# Patient Record
Sex: FEMALE | Race: WHITE | NOT HISPANIC OR LATINO | Employment: FULL TIME | URBAN - METROPOLITAN AREA
[De-identification: names, ages, dates, MRNs, and addresses within clinical notes are randomized per-mention and may not be internally consistent; named-entity substitution may affect disease eponyms.]

---

## 2020-05-05 ENCOUNTER — APPOINTMENT (OUTPATIENT)
Dept: GENERAL RADIOLOGY | Facility: HOSPITAL | Age: 40
End: 2020-05-05

## 2020-05-05 ENCOUNTER — HOSPITAL ENCOUNTER (EMERGENCY)
Facility: HOSPITAL | Age: 40
Discharge: HOME | End: 2020-05-05
Attending: EMERGENCY MEDICINE | Admitting: EMERGENCY MEDICINE

## 2020-05-05 VITALS
WEIGHT: 165 LBS | DIASTOLIC BLOOD PRESSURE: 67 MMHG | OXYGEN SATURATION: 98 % | TEMPERATURE: 97.7 F | RESPIRATION RATE: 20 BRPM | BODY MASS INDEX: 28.17 KG/M2 | SYSTOLIC BLOOD PRESSURE: 104 MMHG | HEIGHT: 64 IN | HEART RATE: 102 BPM

## 2020-05-05 DIAGNOSIS — F41.1 GENERALIZED ANXIETY DISORDER: ICD-10-CM

## 2020-05-05 DIAGNOSIS — J45.901 MILD ASTHMA WITH EXACERBATION, UNSPECIFIED WHETHER PERSISTENT: Primary | ICD-10-CM

## 2020-05-05 LAB
ALBUMIN SERPL-MCNC: 4.7 G/DL (ref 3.5–5.2)
ALBUMIN/GLOB SERPL: 1.4 G/DL
ALP SERPL-CCNC: 68 U/L (ref 39–117)
ALT SERPL W P-5'-P-CCNC: 25 U/L (ref 1–33)
ANION GAP SERPL CALCULATED.3IONS-SCNC: 17 MMOL/L (ref 5–15)
AST SERPL-CCNC: 32 U/L (ref 1–32)
BASOPHILS # BLD AUTO: 0.03 10*3/MM3 (ref 0–0.2)
BASOPHILS NFR BLD AUTO: 0.3 % (ref 0–1.5)
BILIRUB SERPL-MCNC: 0.4 MG/DL (ref 0.2–1.2)
BUN BLD-MCNC: 11 MG/DL (ref 6–20)
BUN/CREAT SERPL: 15.7 (ref 7–25)
CALCIUM SPEC-SCNC: 9.6 MG/DL (ref 8.6–10.5)
CHLORIDE SERPL-SCNC: 98 MMOL/L (ref 98–107)
CO2 SERPL-SCNC: 22 MMOL/L (ref 22–29)
CREAT BLD-MCNC: 0.7 MG/DL (ref 0.57–1)
DEPRECATED RDW RBC AUTO: 44.6 FL (ref 37–54)
EOSINOPHIL # BLD AUTO: 0.03 10*3/MM3 (ref 0–0.4)
EOSINOPHIL NFR BLD AUTO: 0.3 % (ref 0.3–6.2)
ERYTHROCYTE [DISTWIDTH] IN BLOOD BY AUTOMATED COUNT: 12.5 % (ref 12.3–15.4)
GFR SERPL CREATININE-BSD FRML MDRD: 93 ML/MIN/1.73
GLOBULIN UR ELPH-MCNC: 3.3 GM/DL
GLUCOSE BLD-MCNC: 177 MG/DL (ref 65–99)
HCT VFR BLD AUTO: 47.1 % (ref 34–46.6)
HGB BLD-MCNC: 15.5 G/DL (ref 12–15.9)
HOLD SPECIMEN: NORMAL
HOLD SPECIMEN: NORMAL
IMM GRANULOCYTES # BLD AUTO: 0.04 10*3/MM3 (ref 0–0.05)
IMM GRANULOCYTES NFR BLD AUTO: 0.4 % (ref 0–0.5)
LYMPHOCYTES # BLD AUTO: 3.46 10*3/MM3 (ref 0.7–3.1)
LYMPHOCYTES NFR BLD AUTO: 33.9 % (ref 19.6–45.3)
MCH RBC QN AUTO: 31.6 PG (ref 26.6–33)
MCHC RBC AUTO-ENTMCNC: 32.9 G/DL (ref 31.5–35.7)
MCV RBC AUTO: 96.1 FL (ref 79–97)
MONOCYTES # BLD AUTO: 0.61 10*3/MM3 (ref 0.1–0.9)
MONOCYTES NFR BLD AUTO: 6 % (ref 5–12)
NEUTROPHILS # BLD AUTO: 6.04 10*3/MM3 (ref 1.7–7)
NEUTROPHILS NFR BLD AUTO: 59.1 % (ref 42.7–76)
NRBC BLD AUTO-RTO: 0 /100 WBC (ref 0–0.2)
NT-PROBNP SERPL-MCNC: <5 PG/ML (ref 5–450)
PLATELET # BLD AUTO: 250 10*3/MM3 (ref 140–450)
PMV BLD AUTO: 10.2 FL (ref 6–12)
POTASSIUM BLD-SCNC: 4.4 MMOL/L (ref 3.5–5.2)
PROT SERPL-MCNC: 8 G/DL (ref 6–8.5)
RBC # BLD AUTO: 4.9 10*6/MM3 (ref 3.77–5.28)
SODIUM BLD-SCNC: 137 MMOL/L (ref 136–145)
TROPONIN T SERPL-MCNC: <0.01 NG/ML (ref 0–0.03)
WBC NRBC COR # BLD: 10.21 10*3/MM3 (ref 3.4–10.8)
WHOLE BLOOD HOLD SPECIMEN: NORMAL
WHOLE BLOOD HOLD SPECIMEN: NORMAL

## 2020-05-05 PROCEDURE — 99284 EMERGENCY DEPT VISIT MOD MDM: CPT

## 2020-05-05 PROCEDURE — 83880 ASSAY OF NATRIURETIC PEPTIDE: CPT | Performed by: EMERGENCY MEDICINE

## 2020-05-05 PROCEDURE — 71045 X-RAY EXAM CHEST 1 VIEW: CPT

## 2020-05-05 PROCEDURE — 85025 COMPLETE CBC W/AUTO DIFF WBC: CPT | Performed by: EMERGENCY MEDICINE

## 2020-05-05 PROCEDURE — 93005 ELECTROCARDIOGRAM TRACING: CPT | Performed by: EMERGENCY MEDICINE

## 2020-05-05 PROCEDURE — 80053 COMPREHEN METABOLIC PANEL: CPT | Performed by: EMERGENCY MEDICINE

## 2020-05-05 PROCEDURE — 84484 ASSAY OF TROPONIN QUANT: CPT | Performed by: EMERGENCY MEDICINE

## 2020-05-05 RX ORDER — ALBUTEROL SULFATE 90 UG/1
2 AEROSOL, METERED RESPIRATORY (INHALATION) EVERY 6 HOURS PRN
Qty: 6.7 G | Refills: 0 | Status: SHIPPED | OUTPATIENT
Start: 2020-05-05

## 2020-05-05 RX ORDER — SODIUM CHLORIDE 0.9 % (FLUSH) 0.9 %
10 SYRINGE (ML) INJECTION AS NEEDED
Status: DISCONTINUED | OUTPATIENT
Start: 2020-05-05 | End: 2020-05-06 | Stop reason: HOSPADM

## 2020-05-06 NOTE — ED PROVIDER NOTES
"Subjective   40 yo female presents with shortness of breath related to \"asthma attack\".  The pt states she was on a walk and started developing some shortness of breath and tightness in her chest.  She used her albuterol inhaler and then felt like she might pass out.  She got home and had a bout of diarrhea and became nauseated but did not vomit.  She felt like she might pass out.  She elevated her legs on her couch and had her partner call 911.  The pt states she suffers from anxiety and believes that is a part of her problem.  No chest pain.  No fever or cough.  Her symptoms are beginning to ease now.  No other known health issues.  She is a non-smoker.            Review of Systems   Constitutional: Negative for fever.   HENT: Negative for sore throat.    Respiratory: Positive for shortness of breath. Negative for cough and wheezing.    Cardiovascular: Negative for chest pain and palpitations.   Gastrointestinal: Positive for nausea. Negative for abdominal pain, diarrhea and vomiting.   Genitourinary: Negative for dysuria.   Musculoskeletal: Negative for back pain.   Skin: Negative for pallor and rash.   Allergic/Immunologic: Negative for immunocompromised state.   Neurological: Positive for light-headedness.   Hematological: Negative.    Psychiatric/Behavioral:        Anxiety issues         Past Medical History:   Diagnosis Date   • Anxiety    • Asthma        No Known Allergies    History reviewed. No pertinent surgical history.    History reviewed. No pertinent family history.    Social History     Socioeconomic History   • Marital status: Single     Spouse name: Not on file   • Number of children: Not on file   • Years of education: Not on file   • Highest education level: Not on file   Tobacco Use   • Smoking status: Never Smoker   Substance and Sexual Activity   • Alcohol use: Yes     Alcohol/week: 2.0 standard drinks     Types: 2 Glasses of wine per week   • Drug use: Never           Objective   Physical " Exam   Constitutional: She appears well-developed and well-nourished. She does not appear ill.   HENT:   Head: Normocephalic.   Mouth/Throat: Oropharynx is clear and moist.   Eyes: Pupils are equal, round, and reactive to light.   Neck: Normal range of motion.   Cardiovascular: Normal rate, regular rhythm, normal heart sounds and intact distal pulses.   No murmur heard.  Pulmonary/Chest: Effort normal and breath sounds normal. No respiratory distress. She has no wheezes. She has no rhonchi. She has no rales.   Abdominal: Soft. There is no tenderness.   Musculoskeletal:        Right lower leg: Normal. She exhibits no tenderness and no edema.        Left lower leg: Normal. She exhibits no tenderness and no edema.   Neurological: She is alert.   Skin: Skin is warm and dry. No rash noted.   Psychiatric: Her mood appears anxious.   Tearful at times.  Very anxious.         Procedures           ED Course      The pt appears in no distress but she is quite anxious.  She has an aversion to being touched to have her EKG, CXR, general exam.  No wheezing heard.  O2 sats are in upper 90s on RA.      No concerning labs.  Nothing acute on chest xray.  Pt's albuterol MDI is almost empty.  I will give her an rx for a refill.  Pt will be d/c home to f/u or return if worse.                                       MDM    Final diagnoses:   Mild asthma with exacerbation, unspecified whether persistent   Generalized anxiety disorder            Jacob Ayala, PA  05/05/20 [id number]

## 2020-06-09 ENCOUNTER — OFFICE VISIT (OUTPATIENT)
Dept: INTERNAL MEDICINE | Facility: CLINIC | Age: 40
End: 2020-06-09

## 2020-06-09 VITALS
BODY MASS INDEX: 28.03 KG/M2 | HEIGHT: 64 IN | WEIGHT: 164.2 LBS | TEMPERATURE: 98.4 F | SYSTOLIC BLOOD PRESSURE: 110 MMHG | HEART RATE: 83 BPM | OXYGEN SATURATION: 98 % | DIASTOLIC BLOOD PRESSURE: 70 MMHG

## 2020-06-09 DIAGNOSIS — F41.9 ANXIETY: ICD-10-CM

## 2020-06-09 DIAGNOSIS — J45.20 MILD INTERMITTENT ASTHMA WITHOUT COMPLICATION: ICD-10-CM

## 2020-06-09 DIAGNOSIS — K92.1 HEMATOCHEZIA: ICD-10-CM

## 2020-06-09 DIAGNOSIS — K59.04 CHRONIC IDIOPATHIC CONSTIPATION: ICD-10-CM

## 2020-06-09 DIAGNOSIS — M79.605 LEFT LEG PAIN: Primary | ICD-10-CM

## 2020-06-09 PROCEDURE — 99204 OFFICE O/P NEW MOD 45 MIN: CPT | Performed by: INTERNAL MEDICINE

## 2020-06-09 RX ORDER — CYCLOBENZAPRINE HCL 5 MG
5 TABLET ORAL 3 TIMES DAILY PRN
Qty: 30 TABLET | Refills: 0 | Status: SHIPPED | OUTPATIENT
Start: 2020-06-09 | End: 2020-08-28

## 2020-06-09 NOTE — PROGRESS NOTES
"Internal Medicine New Patient  Joan Dow is a 39 y.o. female who presents today to establish care and with concerns as outlined below.    Chief Complaint  Chief Complaint   Patient presents with   • Leg Pain     Left Leg pain since Feb HPI  Ms. Dow comes in today to establish care. She was previously seen by Marija Roger at [facility]. This was one time in January but she has not returned.     She mainly comes in today for pain. She notes the pain starting in her left low back/glut like a sharp pin that radiates like an electrical pulse down her leg and then up her abdomen and into her shoulder. She is unable to sit for long or ride her bike as she has done in the past. The pain is worsened with sitting and in the office she refuses to sit during our discussion due to the pain but does sit comfortably for her exam. She does not recall how the pain started. She had xrays at [facility] and was told she was constipated. She was initially recommended to ice the area and do PT but did not like this advice and feels the pain has progressed and endorses weakness. She did not try PT but has tried ibuprofen, naproxen, extra strength tylenol with no relief. She demands MRI.    In regards to her abdomen she does endorse daily BM with straining and intermittent small amount of blood on the toilet paper. She does not take any laxatives and denies known history of hemorrhoids. She does not have N/V regularly but has vomited during \"asthma attack.\" She declines rectal exam today.    She has asthma and notes ED visit 5/2020 for asthma attack. She reports development of asthma 2 years ago with onset of mental health issues. She was never formally diagnosed but has had attacks of SOA that she describes as feeling like someone is choking her with associated stomach ache, sensation of needing to have a BM, suffocating feeling. She does get relief with albuterol inhaler.    She has anxiety starting at the " end of 2016. She was seen by Michela Huang and was on zoloft. She has since weaned off medication. She does continue to see Michela Huang.    She has never had a pap smear due to anxiety. She has been to Dr. Moody.       Review of Systems  Review of Systems   Constitutional: Negative for fever.   Respiratory: Negative.    Cardiovascular: Negative.    Gastrointestinal: Positive for abdominal pain, anal bleeding, blood in stool and constipation. Negative for diarrhea, nausea and vomiting.   Genitourinary: Negative for urinary incontinence.   Musculoskeletal: Positive for arthralgias, back pain, gait problem and myalgias.   Skin: Negative.    Neurological: Positive for weakness. Negative for numbness.   Psychiatric/Behavioral: Positive for agitation, behavioral problems, dysphoric mood and sleep disturbance (due to pain). The patient is nervous/anxious.         Past Medical History  Past Medical History:   Diagnosis Date   • Anxiety    • Asthma         Surgical History  History reviewed. No pertinent surgical history.     Family History  Family History   Problem Relation Age of Onset   • Heart disease Mother    • Hypertension Mother    • Diabetes Mother    • Hyperlipidemia Mother    • Hyperlipidemia Father    • Hypertension Father    • Depression Father    • Cancer Sister         Social History  Social History     Socioeconomic History   • Marital status: Single     Spouse name: Not on file   • Number of children: Not on file   • Years of education: Not on file   • Highest education level: Not on file   Tobacco Use   • Smoking status: Never Smoker   • Smokeless tobacco: Never Used   Substance and Sexual Activity   • Alcohol use: Yes     Alcohol/week: 2.0 standard drinks     Types: 2 Glasses of wine per week   • Drug use: Never   • Sexual activity: Yes     Birth control/protection: None        Current Medications  Current Outpatient Medications on File Prior to Visit   Medication Sig Dispense Refill   • albuterol sulfate  " (90 Base) MCG/ACT inhaler Inhale 2 puffs Every 6 (Six) Hours As Needed for Wheezing or Shortness of Air. 6.7 g 0     No current facility-administered medications on file prior to visit.        Allergies  No Known Allergies     Objective  Visit Vitals  /70   Pulse 83   Temp 98.4 °F (36.9 °C)   Ht 162.6 cm (64\")   Wt 74.5 kg (164 lb 3.2 oz)   LMP 05/20/2020 (Approximate)   SpO2 98%   BMI 28.18 kg/m²        Physical Exam  Physical Exam   Constitutional: She is oriented to person, place, and time. She appears well-developed and well-nourished. No distress.   Unwilling to sit in chair, stands during entire conversation but does sit comfortably during exam.   HENT:   Head: Normocephalic and atraumatic.   Right Ear: External ear normal.   Left Ear: External ear normal.   Nose: Nose normal.   Eyes: Conjunctivae are normal. No scleral icterus.   Neck: Neck supple.   Cardiovascular: Normal rate, regular rhythm and normal heart sounds.   No murmur heard.  Pulmonary/Chest: Effort normal and breath sounds normal. No respiratory distress. She has no wheezes.   Abdominal: Soft. Bowel sounds are normal. She exhibits no distension and no mass. There is tenderness (diffuse, mild).   Genitourinary:   Genitourinary Comments: Patient declined rectal exam.   Musculoskeletal: She exhibits tenderness (Left posterior thigh, left buttock. No tenderness with palpation over spine..). She exhibits no edema or deformity.   Straight leg raise negative.   Lymphadenopathy:     She has no cervical adenopathy.   Neurological: She is alert and oriented to person, place, and time. Gait normal.   No gross sensory or motor deficit, strength intact in LE.   Skin: Skin is warm and dry. No rash noted. She is not diaphoretic.   Psychiatric: She has a normal mood and affect. Her behavior is normal. Judgment and thought content normal.   Nursing note and vitals reviewed.       Results  Results for orders placed or performed during the hospital " encounter of 05/05/20   Comprehensive Metabolic Panel   Result Value Ref Range    Glucose 177 (H) 65 - 99 mg/dL    BUN 11 6 - 20 mg/dL    Creatinine 0.70 0.57 - 1.00 mg/dL    Sodium 137 136 - 145 mmol/L    Potassium 4.4 3.5 - 5.2 mmol/L    Chloride 98 98 - 107 mmol/L    CO2 22.0 22.0 - 29.0 mmol/L    Calcium 9.6 8.6 - 10.5 mg/dL    Total Protein 8.0 6.0 - 8.5 g/dL    Albumin 4.70 3.50 - 5.20 g/dL    ALT (SGPT) 25 1 - 33 U/L    AST (SGOT) 32 1 - 32 U/L    Alkaline Phosphatase 68 39 - 117 U/L    Total Bilirubin 0.4 0.2 - 1.2 mg/dL    eGFR Non African Amer 93 >60 mL/min/1.73    Globulin 3.3 gm/dL    A/G Ratio 1.4 g/dL    BUN/Creatinine Ratio 15.7 7.0 - 25.0    Anion Gap 17.0 (H) 5.0 - 15.0 mmol/L   BNP   Result Value Ref Range    proBNP <5.0 (L) 5.0 - 450.0 pg/mL   Troponin   Result Value Ref Range    Troponin T <0.010 0.000 - 0.030 ng/mL   CBC Auto Differential   Result Value Ref Range    WBC 10.21 3.40 - 10.80 10*3/mm3    RBC 4.90 3.77 - 5.28 10*6/mm3    Hemoglobin 15.5 12.0 - 15.9 g/dL    Hematocrit 47.1 (H) 34.0 - 46.6 %    MCV 96.1 79.0 - 97.0 fL    MCH 31.6 26.6 - 33.0 pg    MCHC 32.9 31.5 - 35.7 g/dL    RDW 12.5 12.3 - 15.4 %    RDW-SD 44.6 37.0 - 54.0 fl    MPV 10.2 6.0 - 12.0 fL    Platelets 250 140 - 450 10*3/mm3    Neutrophil % 59.1 42.7 - 76.0 %    Lymphocyte % 33.9 19.6 - 45.3 %    Monocyte % 6.0 5.0 - 12.0 %    Eosinophil % 0.3 0.3 - 6.2 %    Basophil % 0.3 0.0 - 1.5 %    Immature Grans % 0.4 0.0 - 0.5 %    Neutrophils, Absolute 6.04 1.70 - 7.00 10*3/mm3    Lymphocytes, Absolute 3.46 (H) 0.70 - 3.10 10*3/mm3    Monocytes, Absolute 0.61 0.10 - 0.90 10*3/mm3    Eosinophils, Absolute 0.03 0.00 - 0.40 10*3/mm3    Basophils, Absolute 0.03 0.00 - 0.20 10*3/mm3    Immature Grans, Absolute 0.04 0.00 - 0.05 10*3/mm3    nRBC 0.0 0.0 - 0.2 /100 WBC   Light Blue Top   Result Value Ref Range    Extra Tube hold for add-on    Green Top (Gel)   Result Value Ref Range    Extra Tube Hold for add-ons.    Lavender Top    Result Value Ref Range    Extra Tube hold for add-on    Gold Top - SST   Result Value Ref Range    Extra Tube Hold for add-ons.         Assessment and Plan  Joan was seen today for leg pain.    Diagnoses and all orders for this visit:    Left leg pain and low back pain  - Clinical history with subjective LE weakness however exam today reassuring.   - Suspect pain secondary to hamstring injury versus SI dysfunction versus lumbar radiculopathy versus piriformis syndrome  - Has had lumbosacral spine xray at [facility] which she reports showed constipation, will need records.  - Will refer again to PT and prescribe short course of flexeril 5mg TID PRN. Notified patient that flexeril may cause drowsiness and that she should not drive while taking this.  - She was not pleased with my recommendation of PT and muscle relaxer and demanded and MRI be ordered to evaluate her pain as it has not improved since January. I informed her that recommendations are for conservative therapy for 6-8 weeks given lack of warning signs and that she had not yet done PT as recommended previously. She left the office abruptly. Will follow up in 8 weeks and can discuss further workup or referral at that time if symptoms persist.    Mild intermittent asthma without complication  - Doubt diagnosis of asthma, no formal PFTs. Symptoms sound more like panic attack.  - Will need to obtain PFTs at future date.  - Continue PRN albuterol for now.    Anxiety  - Getting counseling with Michela Huang, not on any pharmacotherapy.    Chronic idiopathic constipation  - Suspect her abdominal pain is secondary to constipation. Advised use of miralax daily PRN.   - Reviewed recent labs from ED with normal CMP and CBC.  - Abdominal exam with diffuse mild tenderness but otherwise benign    Hematochezia  - Given straining and constipation suspect hemorrhoids but unable to confirm as she declined rectal exam today.  - Advised her to treat constipation as  above      Health Maintenance   Topic Date Due   • ANNUAL PHYSICAL  09/23/1983   • TDAP/TD VACCINES (1 - Tdap) 09/23/1991   • HEPATITIS C SCREENING  06/09/2020   • PAP SMEAR  06/09/2020   • INFLUENZA VACCINE  08/01/2020     Health Maintenance  - Pap smear: Never had pap due to anxiety. Has seen Dr. Moody.  - Mammogram: Start screening at age 40.  - Colonoscopy: Start screening at age 45-50.  - HCV: order with next labs  - Immunizations: Discuss tdap at next visit  - Depression screening: screen at next visit, following with KISHOR Velazquez.    Return in about 8 weeks (around 8/4/2020) for Follow up leg pain, constipation.

## 2020-08-12 ENCOUNTER — TRANSCRIBE ORDERS (OUTPATIENT)
Dept: ADMINISTRATIVE | Facility: HOSPITAL | Age: 40
End: 2020-08-12

## 2020-08-12 DIAGNOSIS — Z12.31 VISIT FOR SCREENING MAMMOGRAM: Primary | ICD-10-CM

## 2020-08-28 ENCOUNTER — OFFICE VISIT (OUTPATIENT)
Dept: NEUROSURGERY | Facility: CLINIC | Age: 40
End: 2020-08-28

## 2020-08-28 VITALS — BODY MASS INDEX: 28.17 KG/M2 | WEIGHT: 165 LBS | HEIGHT: 64 IN

## 2020-08-28 DIAGNOSIS — M79.605 LEFT LEG PAIN: Primary | ICD-10-CM

## 2020-08-28 PROCEDURE — 99244 OFF/OP CNSLTJ NEW/EST MOD 40: CPT | Performed by: NEUROLOGICAL SURGERY

## 2020-08-28 RX ORDER — GABAPENTIN 300 MG/1
300 CAPSULE ORAL 3 TIMES DAILY
Qty: 90 CAPSULE | Refills: 1 | OUTPATIENT
Start: 2020-08-28 | End: 2020-11-17

## 2020-08-28 RX ORDER — ACETAMINOPHEN 500 MG
1000 TABLET ORAL EVERY 6 HOURS PRN
COMMUNITY

## 2020-08-28 NOTE — PROGRESS NOTES
Subjective     Chief Complaint: Left leg pain    Patient ID: Joan Dow is a 39 y.o. female seen for consultation today at the request of  Johnna Nance MD    Leg Pain    The incident occurred more than 1 week ago. There was no injury mechanism. The pain is present in the left leg. The pain is at a severity of 7/10. The pain is severe. The pain has been constant since onset. Treatments tried: Physical therapy. The treatment provided mild relief.       This is a 39-year-old woman who presents to my office with chief complaints of 1 year history of severe left buttock and leg pain.  She is tried multiple rounds of physical therapy without relief.  She has not tried injections or gabapentin.  She does not have much in the way of medical comorbidities.    The following portions of the patient's history were reviewed and updated as appropriate: allergies, current medications, past family history, past medical history, past social history, past surgical history and problem list.    Family history:   Family History   Problem Relation Age of Onset   • Heart disease Mother    • Hypertension Mother    • Diabetes Mother    • Hyperlipidemia Mother    • Hyperlipidemia Father    • Hypertension Father    • Depression Father    • Hodgkin's lymphoma Sister    • Cancer Sister         Nonhodgkins lymphoma   • No Known Problems Brother         Smoker       Social history:   Social History     Socioeconomic History   • Marital status: Single     Spouse name: Not on file   • Number of children: Not on file   • Years of education: Not on file   • Highest education level: Not on file   Tobacco Use   • Smoking status: Never Smoker   • Smokeless tobacco: Never Used   Substance and Sexual Activity   • Alcohol use: Yes     Frequency: 2-4 times a month     Drinks per session: 1 or 2     Comment: 2 or less drinks a week   • Drug use: Yes     Types: Marijuana     Comment: Every couple days   • Sexual activity: Yes     Birth  "control/protection: None       Review of Systems   Respiratory: Positive for shortness of breath.    All other systems reviewed and are negative.      Objective   Height 162.6 cm (64\"), weight 74.8 kg (165 lb).  Body mass index is 28.32 kg/m².    Physical Exam   Constitutional: She is oriented to person, place, and time. She appears well-developed.  Non-toxic appearance.   HENT:   Head: Normocephalic and atraumatic.   Right Ear: Hearing normal.   Left Ear: Hearing normal.   Nose: Nose normal.   Eyes: Pupils are equal, round, and reactive to light. Conjunctivae, EOM and lids are normal.   Neck: Normal range of motion. No JVD present.   Cardiovascular: Normal rate and regular rhythm.   Pulses:       Radial pulses are 2+ on the right side, and 2+ on the left side.   Pulmonary/Chest: Effort normal. No stridor. No respiratory distress. She has no wheezes.   Musculoskeletal:        Right hip: She exhibits decreased range of motion.        Left hip: She exhibits decreased range of motion.   Neurological: She is alert and oriented to person, place, and time. She displays normal reflexes. No cranial nerve deficit. She exhibits normal muscle tone. She displays a negative Romberg sign. Coordination and gait normal. GCS eye subscore is 4. GCS verbal subscore is 5. GCS motor subscore is 6.   Reflex Scores:       Patellar reflexes are 1+ on the right side and 1+ on the left side.       Achilles reflexes are 1+ on the right side and 1+ on the left side.  Skin: Skin is warm and dry. No rash noted. No erythema.   Psychiatric: She has a normal mood and affect. Her behavior is normal. Judgment and thought content normal.   Nursing note and vitals reviewed.        Assessment/Plan     Independent Review of Radiographic Studies:      Available for my review is a MRI of the lumbar spine which was performed on 8/7/2020.  This demonstrated a laterally extruded disc fragment at L5-S1 which is eccentric to the left side causing compression of " the descending S1 nerve root.    Medical Decision Making:      This is a 39-year-old woman with left S1 radiculopathy.  Informed consent for a left-sided L5-S1 microdiscectomy was obtained from the patient.  She would like to try conservative treatments.  I reviewed the pertinent anatomy with the aid of a 3-dimensional model of the spine.  We will set her up for an injection and some Neurontin.  If she still is having significant left-sided sciatica, then she would be a candidate for surgery.  I will follow-up with her after her injection.    Diagnoses and all orders for this visit:    Left leg pain  -     Ambulatory Referral to Pain Management  -     gabapentin (NEURONTIN) 300 MG capsule; Take 1 capsule by mouth 3 (Three) Times a Day.        No follow-ups on file.           This document signed by FRANCIA Cee MD August 28, 2020 13:09

## 2020-08-31 ENCOUNTER — TELEPHONE (OUTPATIENT)
Dept: PAIN MEDICINE | Facility: CLINIC | Age: 40
End: 2020-08-31

## 2020-08-31 ENCOUNTER — TELEPHONE (OUTPATIENT)
Dept: NEUROSURGERY | Facility: CLINIC | Age: 40
End: 2020-08-31

## 2020-08-31 NOTE — TELEPHONE ENCOUNTER
----- Message from Elgin Moseley MD sent at 8/28/2020  1:16 PM EDT -----  READY TO SCHEDULE     FOR EPIDURAL   From FRANCIA Cee MD August 28, 2020   For Left leg pain     1 year history of severe left buttock and leg pain.    She is tried multiple rounds of physical therapy without relief.  She has not tried injections or gabapentin.  She does not have much in the way of medical comorbidities.     MRI of the lumbar spine on 8/7/2020 demonstrated a laterally extruded disc fragment at L5-S1 which is eccentric to the left side causing compression of the descending S1 nerve root.  left S1 radiculopathy.    She would like to try conservative treatments.    for an injection and Neurontin.    follow-up with her after her injection for left-sided L5-S1 microdiscectomy     DIAGNOSTICS  MRI of the lumbar spine 08/07/2020: Normal alignment. The conus medullaris is normal at the L1 level  L2-L3, L3-L4: disc bulges. No significant canal or NF stenosis  L4-L5: Disc protrusion. No central canal stenosis. Mild bilateral NF stenosis  L5-S1: Left paracentral disc protrusion with mass effect on the transversing left S1 nerve root. There is stenosis of the left lateral recess. There is no central canal or neuroforaminal stenosis

## 2020-08-31 NOTE — TELEPHONE ENCOUNTER
Patient called and left a voicemail regarding her office visit on 8/28.  I called the patient and talked with her.  She had many questions regarding the surgical option that was discussed at the visit.  I described the microdiscectomy procedure in detail including the risks and benefits.  Patient is leaning towards scheduling surgery but would like to do a bit more research on her own.  She will call back if she is interested in scheduling.

## 2020-09-03 ENCOUNTER — PREP FOR SURGERY (OUTPATIENT)
Dept: OTHER | Facility: HOSPITAL | Age: 40
End: 2020-09-03

## 2020-09-03 DIAGNOSIS — M51.16 LUMBAR DISC HERNIATION WITH RADICULOPATHY: Primary | ICD-10-CM

## 2020-09-03 RX ORDER — CEFAZOLIN SODIUM 2 G/100ML
2 INJECTION, SOLUTION INTRAVENOUS ONCE
Status: CANCELLED | OUTPATIENT
Start: 2020-09-03 | End: 2020-09-03

## 2020-09-03 RX ORDER — DEXAMETHASONE IN 0.9 % SOD CHL 10 MG/50ML
10 INTRAVENOUS SOLUTION, PIGGYBACK (ML) INTRAVENOUS ONCE
Status: CANCELLED | OUTPATIENT
Start: 2020-09-03 | End: 2020-09-03

## 2020-09-03 RX ORDER — CHLORHEXIDINE GLUCONATE 4 G/100ML
SOLUTION TOPICAL
Qty: 120 ML | Refills: 0 | OUTPATIENT
Start: 2020-09-03 | End: 2020-09-15 | Stop reason: HOSPADM

## 2020-09-13 ENCOUNTER — APPOINTMENT (OUTPATIENT)
Dept: PREADMISSION TESTING | Facility: HOSPITAL | Age: 40
End: 2020-09-13

## 2020-09-13 VITALS — BODY MASS INDEX: 27.74 KG/M2 | HEIGHT: 64 IN | WEIGHT: 162.48 LBS

## 2020-09-13 DIAGNOSIS — M51.16 LUMBAR DISC HERNIATION WITH RADICULOPATHY: ICD-10-CM

## 2020-09-13 LAB
ANION GAP SERPL CALCULATED.3IONS-SCNC: 9 MMOL/L (ref 5–15)
BILIRUB UR QL STRIP: NEGATIVE
BUN SERPL-MCNC: 14 MG/DL (ref 6–20)
BUN/CREAT SERPL: 18.9 (ref 7–25)
CALCIUM SPEC-SCNC: 10.1 MG/DL (ref 8.6–10.5)
CHLORIDE SERPL-SCNC: 103 MMOL/L (ref 98–107)
CLARITY UR: CLEAR
CO2 SERPL-SCNC: 26 MMOL/L (ref 22–29)
COLOR UR: YELLOW
CREAT SERPL-MCNC: 0.74 MG/DL (ref 0.57–1)
DEPRECATED RDW RBC AUTO: 46.2 FL (ref 37–54)
ERYTHROCYTE [DISTWIDTH] IN BLOOD BY AUTOMATED COUNT: 12.9 % (ref 12.3–15.4)
GFR SERPL CREATININE-BSD FRML MDRD: 87 ML/MIN/1.73
GLUCOSE SERPL-MCNC: 83 MG/DL (ref 65–99)
GLUCOSE UR STRIP-MCNC: NEGATIVE MG/DL
HCG INTACT+B SERPL-ACNC: <0.5 MIU/ML
HCT VFR BLD AUTO: 41.4 % (ref 34–46.6)
HGB BLD-MCNC: 13.6 G/DL (ref 12–15.9)
HGB UR QL STRIP.AUTO: NEGATIVE
KETONES UR QL STRIP: NEGATIVE
LEUKOCYTE ESTERASE UR QL STRIP.AUTO: NEGATIVE
MCH RBC QN AUTO: 31.6 PG (ref 26.6–33)
MCHC RBC AUTO-ENTMCNC: 32.9 G/DL (ref 31.5–35.7)
MCV RBC AUTO: 96.1 FL (ref 79–97)
MRSA DNA SPEC QL NAA+PROBE: NEGATIVE
NITRITE UR QL STRIP: NEGATIVE
PH UR STRIP.AUTO: <=5 [PH] (ref 5–8)
PLATELET # BLD AUTO: 226 10*3/MM3 (ref 140–450)
PMV BLD AUTO: 10.2 FL (ref 6–12)
POTASSIUM SERPL-SCNC: 5.1 MMOL/L (ref 3.5–5.2)
PROT UR QL STRIP: NEGATIVE
RBC # BLD AUTO: 4.31 10*6/MM3 (ref 3.77–5.28)
SODIUM SERPL-SCNC: 138 MMOL/L (ref 136–145)
SP GR UR STRIP: 1.01 (ref 1–1.03)
UROBILINOGEN UR QL STRIP: NORMAL
WBC # BLD AUTO: 8.02 10*3/MM3 (ref 3.4–10.8)

## 2020-09-13 PROCEDURE — C9803 HOPD COVID-19 SPEC COLLECT: HCPCS

## 2020-09-13 PROCEDURE — 93005 ELECTROCARDIOGRAM TRACING: CPT

## 2020-09-13 PROCEDURE — 85027 COMPLETE CBC AUTOMATED: CPT | Performed by: PHYSICIAN ASSISTANT

## 2020-09-13 PROCEDURE — 36415 COLL VENOUS BLD VENIPUNCTURE: CPT

## 2020-09-13 PROCEDURE — 81003 URINALYSIS AUTO W/O SCOPE: CPT | Performed by: PHYSICIAN ASSISTANT

## 2020-09-13 PROCEDURE — 84702 CHORIONIC GONADOTROPIN TEST: CPT | Performed by: PHYSICIAN ASSISTANT

## 2020-09-13 PROCEDURE — 93010 ELECTROCARDIOGRAM REPORT: CPT | Performed by: INTERNAL MEDICINE

## 2020-09-13 PROCEDURE — 87641 MR-STAPH DNA AMP PROBE: CPT | Performed by: NEUROLOGICAL SURGERY

## 2020-09-13 PROCEDURE — 80048 BASIC METABOLIC PNL TOTAL CA: CPT | Performed by: PHYSICIAN ASSISTANT

## 2020-09-13 PROCEDURE — U0004 COV-19 TEST NON-CDC HGH THRU: HCPCS

## 2020-09-14 ENCOUNTER — ANESTHESIA EVENT (OUTPATIENT)
Dept: PERIOP | Facility: HOSPITAL | Age: 40
End: 2020-09-14

## 2020-09-14 ENCOUNTER — TELEPHONE (OUTPATIENT)
Dept: NEUROSURGERY | Facility: CLINIC | Age: 40
End: 2020-09-14

## 2020-09-14 LAB — SARS-COV-2 RNA NOSE QL NAA+PROBE: NOT DETECTED

## 2020-09-14 RX ORDER — FAMOTIDINE 10 MG/ML
20 INJECTION, SOLUTION INTRAVENOUS ONCE
Status: CANCELLED | OUTPATIENT
Start: 2020-09-14 | End: 2020-09-14

## 2020-09-14 NOTE — TELEPHONE ENCOUNTER
Pt left msg stating she has several questions regarding post op care and what to expect when she arrives at the hospital for sx tomorrow.

## 2020-09-15 ENCOUNTER — APPOINTMENT (OUTPATIENT)
Dept: GENERAL RADIOLOGY | Facility: HOSPITAL | Age: 40
End: 2020-09-15

## 2020-09-15 ENCOUNTER — HOSPITAL ENCOUNTER (OUTPATIENT)
Facility: HOSPITAL | Age: 40
Setting detail: HOSPITAL OUTPATIENT SURGERY
Discharge: HOME | End: 2020-09-15
Attending: NEUROLOGICAL SURGERY | Admitting: NEUROLOGICAL SURGERY

## 2020-09-15 ENCOUNTER — ANESTHESIA (OUTPATIENT)
Dept: PERIOP | Facility: HOSPITAL | Age: 40
End: 2020-09-15

## 2020-09-15 VITALS
HEART RATE: 82 BPM | RESPIRATION RATE: 16 BRPM | DIASTOLIC BLOOD PRESSURE: 85 MMHG | WEIGHT: 162 LBS | HEIGHT: 64 IN | SYSTOLIC BLOOD PRESSURE: 146 MMHG | BODY MASS INDEX: 27.66 KG/M2 | TEMPERATURE: 98 F | OXYGEN SATURATION: 94 %

## 2020-09-15 DIAGNOSIS — M79.605 LEFT LEG PAIN: Primary | ICD-10-CM

## 2020-09-15 DIAGNOSIS — M51.16 LUMBAR DISC HERNIATION WITH RADICULOPATHY: ICD-10-CM

## 2020-09-15 PROCEDURE — 25010000002 PROPOFOL 10 MG/ML EMULSION: Performed by: NURSE ANESTHETIST, CERTIFIED REGISTERED

## 2020-09-15 PROCEDURE — 25010000002 DEXAMETHASONE PER 1 MG: Performed by: NURSE ANESTHETIST, CERTIFIED REGISTERED

## 2020-09-15 PROCEDURE — 25010000002 ONDANSETRON PER 1 MG: Performed by: NURSE ANESTHETIST, CERTIFIED REGISTERED

## 2020-09-15 PROCEDURE — 25010000002 METHYLPREDNISOLONE PER 40 MG: Performed by: NEUROLOGICAL SURGERY

## 2020-09-15 PROCEDURE — 25010000002 FENTANYL CITRATE (PF) 100 MCG/2ML SOLUTION: Performed by: NURSE ANESTHETIST, CERTIFIED REGISTERED

## 2020-09-15 PROCEDURE — 76000 FLUOROSCOPY <1 HR PHYS/QHP: CPT

## 2020-09-15 PROCEDURE — 63030 LAMOT DCMPRN NRV RT 1 LMBR: CPT | Performed by: NEUROLOGICAL SURGERY

## 2020-09-15 PROCEDURE — 25010000002 PHENYLEPHRINE PER 1 ML: Performed by: NURSE ANESTHETIST, CERTIFIED REGISTERED

## 2020-09-15 PROCEDURE — 63030 LAMOT DCMPRN NRV RT 1 LMBR: CPT | Performed by: PHYSICIAN ASSISTANT

## 2020-09-15 PROCEDURE — 25010000003 CEFAZOLIN IN DEXTROSE 2-4 GM/100ML-% SOLUTION: Performed by: PHYSICIAN ASSISTANT

## 2020-09-15 PROCEDURE — 25010000002 NEOSTIGMINE 10 MG/10ML SOLUTION: Performed by: NURSE ANESTHETIST, CERTIFIED REGISTERED

## 2020-09-15 DEVICE — FLOSEAL HEMOSTATIC MATRIX, 10ML
Type: IMPLANTABLE DEVICE | Status: FUNCTIONAL
Brand: FLOSEAL HEMOSTATIC MATRIX

## 2020-09-15 DEVICE — HEMOST ABS SURGIFOAM SZ100 8X12 10MM: Type: IMPLANTABLE DEVICE | Status: FUNCTIONAL

## 2020-09-15 RX ORDER — PROPOFOL 10 MG/ML
VIAL (ML) INTRAVENOUS AS NEEDED
Status: DISCONTINUED | OUTPATIENT
Start: 2020-09-15 | End: 2020-09-15 | Stop reason: SURG

## 2020-09-15 RX ORDER — FENTANYL CITRATE 50 UG/ML
50 INJECTION, SOLUTION INTRAMUSCULAR; INTRAVENOUS
Status: DISCONTINUED | OUTPATIENT
Start: 2020-09-15 | End: 2020-09-15 | Stop reason: HOSPADM

## 2020-09-15 RX ORDER — FENTANYL CITRATE 50 UG/ML
INJECTION, SOLUTION INTRAMUSCULAR; INTRAVENOUS AS NEEDED
Status: DISCONTINUED | OUTPATIENT
Start: 2020-09-15 | End: 2020-09-15 | Stop reason: SURG

## 2020-09-15 RX ORDER — DEXAMETHASONE SODIUM PHOSPHATE 4 MG/ML
8 INJECTION, SOLUTION INTRA-ARTICULAR; INTRALESIONAL; INTRAMUSCULAR; INTRAVENOUS; SOFT TISSUE ONCE AS NEEDED
Status: DISCONTINUED | OUTPATIENT
Start: 2020-09-15 | End: 2020-09-15 | Stop reason: HOSPADM

## 2020-09-15 RX ORDER — DEXAMETHASONE IN 0.9 % SOD CHL 10 MG/50ML
10 INTRAVENOUS SOLUTION, PIGGYBACK (ML) INTRAVENOUS ONCE
Status: DISCONTINUED | OUTPATIENT
Start: 2020-09-15 | End: 2020-09-15 | Stop reason: HOSPADM

## 2020-09-15 RX ORDER — LIDOCAINE HYDROCHLORIDE AND EPINEPHRINE 5; 5 MG/ML; UG/ML
INJECTION, SOLUTION INFILTRATION; PERINEURAL AS NEEDED
Status: DISCONTINUED | OUTPATIENT
Start: 2020-09-15 | End: 2020-09-15 | Stop reason: HOSPADM

## 2020-09-15 RX ORDER — SODIUM CHLORIDE 0.9 % (FLUSH) 0.9 %
10 SYRINGE (ML) INJECTION EVERY 12 HOURS SCHEDULED
Status: DISCONTINUED | OUTPATIENT
Start: 2020-09-15 | End: 2020-09-15 | Stop reason: HOSPADM

## 2020-09-15 RX ORDER — SODIUM CHLORIDE, SODIUM LACTATE, POTASSIUM CHLORIDE, CALCIUM CHLORIDE 600; 310; 30; 20 MG/100ML; MG/100ML; MG/100ML; MG/100ML
9 INJECTION, SOLUTION INTRAVENOUS CONTINUOUS
Status: DISCONTINUED | OUTPATIENT
Start: 2020-09-15 | End: 2020-09-15 | Stop reason: HOSPADM

## 2020-09-15 RX ORDER — ROCURONIUM BROMIDE 10 MG/ML
INJECTION, SOLUTION INTRAVENOUS AS NEEDED
Status: DISCONTINUED | OUTPATIENT
Start: 2020-09-15 | End: 2020-09-15 | Stop reason: SURG

## 2020-09-15 RX ORDER — ONDANSETRON 2 MG/ML
INJECTION INTRAMUSCULAR; INTRAVENOUS AS NEEDED
Status: DISCONTINUED | OUTPATIENT
Start: 2020-09-15 | End: 2020-09-15 | Stop reason: SURG

## 2020-09-15 RX ORDER — FAMOTIDINE 20 MG/1
20 TABLET, FILM COATED ORAL ONCE
Status: COMPLETED | OUTPATIENT
Start: 2020-09-15 | End: 2020-09-15

## 2020-09-15 RX ORDER — HYDROMORPHONE HYDROCHLORIDE 1 MG/ML
0.5 INJECTION, SOLUTION INTRAMUSCULAR; INTRAVENOUS; SUBCUTANEOUS
Status: DISCONTINUED | OUTPATIENT
Start: 2020-09-15 | End: 2020-09-15 | Stop reason: HOSPADM

## 2020-09-15 RX ORDER — LIDOCAINE HYDROCHLORIDE 10 MG/ML
0.5 INJECTION, SOLUTION EPIDURAL; INFILTRATION; INTRACAUDAL; PERINEURAL ONCE AS NEEDED
Status: COMPLETED | OUTPATIENT
Start: 2020-09-15 | End: 2020-09-15

## 2020-09-15 RX ORDER — GLYCOPYRROLATE 0.2 MG/ML
INJECTION INTRAMUSCULAR; INTRAVENOUS AS NEEDED
Status: DISCONTINUED | OUTPATIENT
Start: 2020-09-15 | End: 2020-09-15 | Stop reason: SURG

## 2020-09-15 RX ORDER — HYDROCODONE BITARTRATE AND ACETAMINOPHEN 5; 325 MG/1; MG/1
1 TABLET ORAL EVERY 4 HOURS PRN
Qty: 35 TABLET | Refills: 0 | Status: SHIPPED | OUTPATIENT
Start: 2020-09-15 | End: 2020-11-02

## 2020-09-15 RX ORDER — METHYLPREDNISOLONE ACETATE 40 MG/ML
INJECTION, SUSPENSION INTRA-ARTICULAR; INTRALESIONAL; INTRAMUSCULAR; SOFT TISSUE AS NEEDED
Status: DISCONTINUED | OUTPATIENT
Start: 2020-09-15 | End: 2020-09-15 | Stop reason: HOSPADM

## 2020-09-15 RX ORDER — DEXAMETHASONE SODIUM PHOSPHATE 10 MG/ML
INJECTION INTRAMUSCULAR; INTRAVENOUS AS NEEDED
Status: DISCONTINUED | OUTPATIENT
Start: 2020-09-15 | End: 2020-09-15 | Stop reason: SURG

## 2020-09-15 RX ORDER — ONDANSETRON 2 MG/ML
4 INJECTION INTRAMUSCULAR; INTRAVENOUS ONCE AS NEEDED
Status: DISCONTINUED | OUTPATIENT
Start: 2020-09-15 | End: 2020-09-15 | Stop reason: HOSPADM

## 2020-09-15 RX ORDER — LIDOCAINE HYDROCHLORIDE 20 MG/ML
INJECTION, SOLUTION INFILTRATION; PERINEURAL AS NEEDED
Status: DISCONTINUED | OUTPATIENT
Start: 2020-09-15 | End: 2020-09-15 | Stop reason: SURG

## 2020-09-15 RX ORDER — NEOSTIGMINE METHYLSULFATE 1 MG/ML
INJECTION, SOLUTION INTRAVENOUS AS NEEDED
Status: DISCONTINUED | OUTPATIENT
Start: 2020-09-15 | End: 2020-09-15 | Stop reason: SURG

## 2020-09-15 RX ORDER — SODIUM CHLORIDE 0.9 % (FLUSH) 0.9 %
10 SYRINGE (ML) INJECTION AS NEEDED
Status: DISCONTINUED | OUTPATIENT
Start: 2020-09-15 | End: 2020-09-15 | Stop reason: HOSPADM

## 2020-09-15 RX ORDER — CEFAZOLIN SODIUM 2 G/100ML
2 INJECTION, SOLUTION INTRAVENOUS ONCE
Status: COMPLETED | OUTPATIENT
Start: 2020-09-15 | End: 2020-09-15

## 2020-09-15 RX ADMIN — CEFAZOLIN SODIUM 2 G: 2 INJECTION, SOLUTION INTRAVENOUS at 12:48

## 2020-09-15 RX ADMIN — PHENYLEPHRINE HYDROCHLORIDE 80 MCG: 10 INJECTION INTRAVENOUS at 13:26

## 2020-09-15 RX ADMIN — FENTANYL CITRATE 50 MCG: 0.05 INJECTION, SOLUTION INTRAMUSCULAR; INTRAVENOUS at 14:13

## 2020-09-15 RX ADMIN — LIDOCAINE HYDROCHLORIDE 0.5 ML: 10 INJECTION, SOLUTION EPIDURAL; INFILTRATION; INTRACAUDAL; PERINEURAL at 11:03

## 2020-09-15 RX ADMIN — FENTANYL CITRATE 50 MCG: 0.05 INJECTION, SOLUTION INTRAMUSCULAR; INTRAVENOUS at 14:17

## 2020-09-15 RX ADMIN — SODIUM CHLORIDE, POTASSIUM CHLORIDE, SODIUM LACTATE AND CALCIUM CHLORIDE 9 ML/HR: 600; 310; 30; 20 INJECTION, SOLUTION INTRAVENOUS at 11:03

## 2020-09-15 RX ADMIN — FAMOTIDINE 20 MG: 20 TABLET, FILM COATED ORAL at 11:09

## 2020-09-15 RX ADMIN — DEXAMETHASONE SODIUM PHOSPHATE 10 MG: 10 INJECTION INTRAMUSCULAR; INTRAVENOUS at 12:56

## 2020-09-15 RX ADMIN — GLYCOPYRROLATE 0.4 MG: 0.2 INJECTION INTRAMUSCULAR; INTRAVENOUS at 13:47

## 2020-09-15 RX ADMIN — FENTANYL CITRATE 25 MCG: 50 INJECTION, SOLUTION INTRAMUSCULAR; INTRAVENOUS at 13:17

## 2020-09-15 RX ADMIN — ROCURONIUM BROMIDE 40 MG: 10 INJECTION INTRAVENOUS at 12:52

## 2020-09-15 RX ADMIN — NEOSTIGMINE 2.5 MG: 1 INJECTION INTRAVENOUS at 13:47

## 2020-09-15 RX ADMIN — PHENYLEPHRINE HYDROCHLORIDE 80 MCG: 10 INJECTION INTRAVENOUS at 13:21

## 2020-09-15 RX ADMIN — GLYCOPYRROLATE 0.2 MG: 0.2 INJECTION INTRAMUSCULAR; INTRAVENOUS at 13:22

## 2020-09-15 RX ADMIN — ONDANSETRON 4 MG: 2 INJECTION INTRAMUSCULAR; INTRAVENOUS at 13:45

## 2020-09-15 RX ADMIN — PROPOFOL 200 MG: 10 INJECTION, EMULSION INTRAVENOUS at 12:52

## 2020-09-15 RX ADMIN — LIDOCAINE HYDROCHLORIDE 50 MG: 20 INJECTION, SOLUTION INFILTRATION; PERINEURAL at 12:52

## 2020-09-15 RX ADMIN — FENTANYL CITRATE 25 MCG: 50 INJECTION, SOLUTION INTRAMUSCULAR; INTRAVENOUS at 13:29

## 2020-09-15 RX ADMIN — FENTANYL CITRATE 25 MCG: 50 INJECTION, SOLUTION INTRAMUSCULAR; INTRAVENOUS at 13:46

## 2020-09-15 RX ADMIN — EPHEDRINE SULFATE 15 MG: 50 INJECTION INTRAMUSCULAR; INTRAVENOUS; SUBCUTANEOUS at 13:28

## 2020-09-15 RX ADMIN — FENTANYL CITRATE 25 MCG: 50 INJECTION, SOLUTION INTRAMUSCULAR; INTRAVENOUS at 13:32

## 2020-09-15 NOTE — ANESTHESIA PREPROCEDURE EVALUATION
Anesthesia Evaluation     Patient summary reviewed and Nursing notes reviewed   no history of anesthetic complications:  NPO Solid Status: > 8 hours  NPO Liquid Status: > 2 hours           Airway   Mallampati: I  TM distance: >3 FB  Neck ROM: full  No difficulty expected  Dental - normal exam     Pulmonary     breath sounds clear to auscultation  (+) asthma,  Cardiovascular   Exercise tolerance: excellent (>7 METS)    Rhythm: regular  Rate: normal        Neuro/Psych  (+) numbness, psychiatric history Anxiety,     GI/Hepatic/Renal/Endo      Musculoskeletal     (+) back pain,   Abdominal   (-) obese   Substance History      OB/GYN          Other   arthritis,                      Anesthesia Plan    ASA 2     general     intravenous induction     Anesthetic plan, all risks, benefits, and alternatives have been provided, discussed and informed consent has been obtained with: patient.    Plan discussed with CRNA.

## 2020-09-15 NOTE — ANESTHESIA PROCEDURE NOTES
Airway  Urgency: elective    Date/Time: 9/15/2020 12:54 PM  Airway not difficult    General Information and Staff    Patient location during procedure: OR    Indications and Patient Condition  Indications for airway management: airway protection    Preoxygenated: yes  Mask difficulty assessment: 1 - vent by mask    Final Airway Details  Final airway type: endotracheal airway      Successful airway: ETT  Cuffed: yes   Successful intubation technique: direct laryngoscopy  Facilitating devices/methods: intubating stylet  Endotracheal tube insertion site: oral  Blade: Romero  Blade size: 2  ETT size (mm): 7.0  Cormack-Lehane Classification: grade I - full view of glottis  Placement verified by: chest auscultation and capnometry   Measured from: lips  ETT/EBT  to lips (cm): 21  Number of attempts at approach: 1  Assessment: lips, teeth, and gum same as pre-op and atraumatic intubation

## 2020-09-17 ENCOUNTER — TELEPHONE (OUTPATIENT)
Dept: NEUROSURGERY | Facility: CLINIC | Age: 40
End: 2020-09-17

## 2020-09-17 NOTE — TELEPHONE ENCOUNTER
Provider:  Coleman  Caller: Patient  Time of call:   1:57pm  Phone #: [phone]   Surgery:  LUMBAR DISCECTOMY MICRO LEFT L5-S1  Surgery Date:  09/15/2020  Last visit:   08/28/2020  Next visit:  VIRI BELL:         Reason for call:  Patient called [facility] requesting a call back from Julianna Alcantara PA-C. Patient stated she is feeling ok, but is having some numbness and tingling. Would like a call back so she can discuss this further.

## 2020-09-17 NOTE — TELEPHONE ENCOUNTER
Called patient and answered all questions.  She also called to schedule her 2-week follow-up.  Routing to Katrin.

## 2020-09-18 NOTE — TELEPHONE ENCOUNTER
Patient has been scheduled for her post op appointment.  She will be forwarding paperwork to me via email for her work.  I will hand those over to Carmen.

## 2020-09-28 ENCOUNTER — OFFICE VISIT (OUTPATIENT)
Dept: NEUROSURGERY | Facility: CLINIC | Age: 40
End: 2020-09-28

## 2020-09-28 VITALS — HEIGHT: 64 IN | BODY MASS INDEX: 27.31 KG/M2 | WEIGHT: 160 LBS

## 2020-09-28 DIAGNOSIS — Z09 POSTOPERATIVE FOLLOW-UP: Primary | ICD-10-CM

## 2020-09-28 DIAGNOSIS — Z98.890 S/P LUMBAR DISCECTOMY: ICD-10-CM

## 2020-09-28 DIAGNOSIS — M51.16 LUMBAR DISC HERNIATION WITH RADICULOPATHY: ICD-10-CM

## 2020-09-28 PROCEDURE — 99024 POSTOP FOLLOW-UP VISIT: CPT | Performed by: PHYSICIAN ASSISTANT

## 2020-09-28 NOTE — PROGRESS NOTES
"   CHRISTIAN Dow presents for postoperative follow up s/p left-sided L5-S1 microdiscectomy on 9/15/2020. The patient has had a relatively normal postoperative course.  The patient has had no issues with the wound. The patient's reports improved preoperative lower extremity pain. She still has occasional sciatic pain that is occurs less frequent than prior to surgery. She continues to harbor numbness/tingling that she states is improved since surgery. She has been very sedentary and cautious since surgery, reports she is quite anxious to do any activity for fear of \"messing something up\". She continues to take Gabapenitn and is taking narcotics 2-3x daily as needed.          Physical Exam  General:  alert, appears stated age and cooperative  Incision:   healing well, no drainage, no seroma, no swelling, incision well approximated  -Normal gait, motor function intact      Assessment/Plan   Assessment:    1. Postoperative follow-up    2. Lumbar disc herniation with radiculopathy    3. S/P lumbar discectomy        Plan:   Continue postoperative wound care as instructed  We discussed beginning to slowly increase her activities including walking and some light stretching. She will continue to wean off narcotics as tolerated. She was instructed to call with new or worsening symptoms. We discussed her ongoing anxiety and she will try some meditation to combat this. She will remain off work until her follow up. We will see her back in four weeks, or sooner if clinically indicated.         Return in 4 weeks (on 10/26/2020), or if symptoms worsen or fail to improve.     Julianna Alcantara PA-C  9/29/2020  "

## 2020-09-29 ENCOUNTER — TELEPHONE (OUTPATIENT)
Dept: NEUROSURGERY | Facility: CLINIC | Age: 40
End: 2020-09-29

## 2020-09-29 NOTE — TELEPHONE ENCOUNTER
All patient's questions were answered.  She was happy with the call.    Although, did not discuss the patient's FMLA paperwork.  If someone wants to follow-up on these, please call patient

## 2020-09-29 NOTE — TELEPHONE ENCOUNTER
THE PATIENT SAW [facility]UNCION GUSMAN YESTERDAY AND WANTED TO ASK SOME ADDITIONAL MEDICAL QUESTIONS. THE PATIENT WANTS TO KNOW IF ITS NORMAL TO HAVE RESIDUAL HAMSTRING PAIN AT THIS STAGE AFTER HER SURGERY. SHE STATED SHE IS ALSO HAVING PAIN IN HER BOTTOM WHICH COMES AND GOES, SHE SOMETIMES EXPERIENCES THE PAIN UP TO AN HOUR.      THE PATIENT ALSO STATED THAT SHE SPOKE WITH ORLY YESTERDAY AT HER APPOINTMENT ABOUT FMLA PAPERWORK AND WANTED TO RELAY THAT SHE HAS ALL THE DOCUMENTS SHE NEEDS FOR HR AT THIS TIME.    PLEASE ADVISE. PATIENT CAN BE REACHED AT: [phone]    THANK YOU!

## 2020-10-12 ENCOUNTER — TELEPHONE (OUTPATIENT)
Dept: NEUROSURGERY | Facility: CLINIC | Age: 40
End: 2020-10-12

## 2020-10-12 RX ORDER — METHYLPREDNISOLONE 4 MG/1
TABLET ORAL
Qty: 21 TABLET | Refills: 0 | Status: SHIPPED | OUTPATIENT
Start: 2020-10-12 | End: 2020-11-02

## 2020-10-12 NOTE — TELEPHONE ENCOUNTER
Called and spoke with patient.    She had expressed recurrent left lower leg symptoms, however they are not constant or activity dependent.  She is questioning whether she is overdoing it with her walking.  She has been walking around the block daily.    Given her discomfort, I have called in a Medrol Dosepak for the following week.  Patient will call the office in 1 week for reassessment.  She is keep her planned FU with Ms. Julianna with this coming 10/30/2020.

## 2020-10-23 ENCOUNTER — TELEPHONE (OUTPATIENT)
Dept: NEUROSURGERY | Facility: CLINIC | Age: 40
End: 2020-10-23

## 2020-10-23 NOTE — TELEPHONE ENCOUNTER
"Provider:  Coleman  Caller:  patient  Time of call:  3:09pm  Phone #:  [phone]  Surgery:  L5-S1 discectomy  Surgery Date:  9/15/20  Last visit:  9/28/20  Next visit:  11/02/20    ARABELLA:         Reason for call:  I had called patient about moving her appointment. She returned my call to reschedule her appointment. After appointment was moved, she wanted to discuss her medication. I advised I would put in a message to our clinical staff.     She began to cry. She was prescribed gabapentin. This helped the pain, but had a side effect of taking her to \"dark places\". She stopped this medication because of this side effect. She did not like how this medication was effecting her mental state. She stated the naproxen is helping. She wants to know if there is another medication for inflammation that she can try.          "

## 2020-10-23 NOTE — TELEPHONE ENCOUNTER
Lyrica can have the same type of side effects.  Patient needs to just try to maintain the naproxen regimen and this should be beneficial.

## 2020-10-26 ENCOUNTER — TELEPHONE (OUTPATIENT)
Dept: NEUROSURGERY | Facility: CLINIC | Age: 40
End: 2020-10-26

## 2020-10-26 DIAGNOSIS — F41.9 ANXIETY: Primary | ICD-10-CM

## 2020-10-26 DIAGNOSIS — J45.20 MILD INTERMITTENT ASTHMA WITHOUT COMPLICATION: ICD-10-CM

## 2020-10-26 DIAGNOSIS — M51.16 LUMBAR DISC HERNIATION WITH RADICULOPATHY: ICD-10-CM

## 2020-10-26 NOTE — TELEPHONE ENCOUNTER
Provider:  Coleman  Caller: Patient  Time of call:   12:54pm  Phone #:  [phone]  Surgery: LUMBAR DISCECTOMY MICRO LEFT L5-S1   Surgery Date:  09/15/2020  Last visit:   09/28/2020  Next visit: VIRI BELL:         Reason for call:   Patient called M stating she has been having trouble sleeping. Patient stated she has tried Eszopiclone (Lunesta), with little help. Wanting to know if there is anything she can take to help her sleep.

## 2020-10-26 NOTE — TELEPHONE ENCOUNTER
PCP referral placed. Who is prescribing her Lunesta? She should contact that provider to discuss other options.

## 2020-10-26 NOTE — TELEPHONE ENCOUNTER
"Called and talked to the patient. Patient verbalized understanding, and stated she actually found the Lunesta, \"laying around the house\".     Patient is requesting to be referred to one of the providers at [facility] ([address]). Patient was advised a referral had been placed, and someone would call her to get the patient scheduled.   "

## 2020-10-26 NOTE — TELEPHONE ENCOUNTER
Called and talked with the patient. Patient stated she does not have a PCP, and the surgery is what has caused all her sleep issues.   Patient wanted to know if a referral can be made to a PCP, or if Dr. Cee can recommend a PCP.

## 2020-11-02 ENCOUNTER — OFFICE VISIT (OUTPATIENT)
Dept: NEUROSURGERY | Facility: CLINIC | Age: 40
End: 2020-11-02

## 2020-11-02 VITALS — TEMPERATURE: 97.8 F | HEIGHT: 64 IN | WEIGHT: 161 LBS | BODY MASS INDEX: 27.49 KG/M2

## 2020-11-02 DIAGNOSIS — M79.662 PAIN OF LEFT CALF: ICD-10-CM

## 2020-11-02 DIAGNOSIS — Z98.890 S/P LUMBAR DISCECTOMY: Primary | ICD-10-CM

## 2020-11-02 PROCEDURE — 99024 POSTOP FOLLOW-UP VISIT: CPT | Performed by: PHYSICIAN ASSISTANT

## 2020-11-02 NOTE — PROGRESS NOTES
"Subjective     Chief Complaint: Postoperative follow-up    Patient ID: Gerri Dow is a 40 y.o. female is here today for follow-up.    History of Present Illness    This is a 40-year-old woman who underwent an uncomplicated left-sided L5-S1 microdiscectomy approximately 6 weeks ago.  Patient reports significant improvement in her radicular pain that she had prior to surgery.  She is complaining of a cramping feeling in her hamstring and calf.  The patient is highly anxious and has been nervous to do any sort of activity.  She has been quite sedentary.  She has discontinued all medications.  She is very fearful of \"messing things up\".  She states she has had the cramping and pain in her left calf and hamstring for several weeks and feels like she \"overdid it\" causing this.  She denies any fevers, chills, shortness of breath, redness or swelling in her calf.    The following portions of the patient's history were reviewed and updated as appropriate: allergies, current medications, past family history, past medical history, past social history, past surgical history and problem list.    Family history:   Family History   Problem Relation Age of Onset   • Heart disease Mother    • Hypertension Mother    • Diabetes Mother    • Hyperlipidemia Mother    • Hyperlipidemia Father    • Hypertension Father    • Depression Father    • Hodgkin's lymphoma Sister    • Cancer Sister         Nonhodgkins lymphoma   • No Known Problems Brother         Smoker       Social history:   Social History     Socioeconomic History   • Marital status: Single     Spouse name: Not on file   • Number of children: Not on file   • Years of education: Not on file   • Highest education level: Not on file   Tobacco Use   • Smoking status: Never Smoker   • Smokeless tobacco: Never Used   Substance and Sexual Activity   • Alcohol use: Yes     Frequency: 2-4 times a month     Drinks per session: 1 or 2     Comment: 2 or less drinks a week   • Drug use: " "Yes     Types: Marijuana     Comment: Every couple days   • Sexual activity: Defer     Birth control/protection: None       Review of Systems   HENT: Positive for sinus pressure.    Eyes: Negative.    Respiratory: Negative.    Cardiovascular: Negative.    Gastrointestinal: Negative.    Endocrine: Negative.    Genitourinary: Negative.    Musculoskeletal: Positive for myalgias.   Skin: Negative.    Allergic/Immunologic: Negative.    Neurological: Positive for weakness, numbness and headaches.   Hematological: Negative.    Psychiatric/Behavioral: Positive for sleep disturbance.       Objective   Temperature 97.8 °F (36.6 °C), height 162.6 cm (64.02\"), weight 73 kg (161 lb).  Body mass index is 27.62 kg/m².  Patient's Body mass index is 27.62 kg/m². BMI is above normal parameters. Recommendations include: nutrition counseling.      Physical Exam  Constitutional:       General: She is not in acute distress.     Appearance: Normal appearance. She is not ill-appearing, toxic-appearing or diaphoretic.   Pulmonary:      Effort: Pulmonary effort is normal.   Musculoskeletal:      Left lower leg: She exhibits tenderness. She exhibits no swelling.   Skin:     General: Skin is warm and dry.      Findings: No erythema.   Neurological:      General: No focal deficit present.      Mental Status: She is alert and oriented to person, place, and time.   Psychiatric:         Mood and Affect: Mood normal.         Behavior: Behavior normal.           Assessment/Plan     This a 40-year-old woman who is seen today in postoperative follow-up 6 weeks status post left-sided L5-S1 microdiscectomy.  She has had improvement in her preoperative pain but is continuing to have some cramping in her hamstring and calf.  Given the cramping and tenderness in her left calf, we have sent her for a lower extremity ultrasound to rule out DVT.  If this is negative, she will begin with some physical therapy.  Dr. Cee and I both discussed with her that she " needs to slowly begin to increase her activities.  She has extremely high anxiety which has been hindering her progress thus far.  We reviewed signs and symptoms that would warrant a more urgent follow-up with our office.  Discussed limitations and restrictions.  She will begin trying to walk around some more and try some PT.  We will call her with the results of her duplex.    Diagnoses and all orders for this visit:    1. S/P lumbar discectomy (Primary)  -     Duplex Venous Lower Extremity - Left CAR; Future  -     Ambulatory Referral to Physical Therapy Evaluate and treat    2. Pain of left calf  -     Duplex Venous Lower Extremity - Left CAR; Future        Return in about 3 months (around 2/2/2021), or if symptoms worsen or fail to improve.             Julianna Alcantara PA-C

## 2020-11-04 ENCOUNTER — HOSPITAL ENCOUNTER (OUTPATIENT)
Dept: CARDIOLOGY | Facility: HOSPITAL | Age: 40
Discharge: HOME | End: 2020-11-04
Admitting: PHYSICIAN ASSISTANT

## 2020-11-04 VITALS — HEIGHT: 64 IN | BODY MASS INDEX: 27.49 KG/M2 | WEIGHT: 161 LBS

## 2020-11-04 DIAGNOSIS — Z98.890 S/P LUMBAR DISCECTOMY: ICD-10-CM

## 2020-11-04 DIAGNOSIS — M79.662 PAIN OF LEFT CALF: ICD-10-CM

## 2020-11-04 LAB
BH CV ECHO MEAS - BSA(HAYCOCK): 1.8 M^2
BH CV ECHO MEAS - BSA: 1.8 M^2
BH CV ECHO MEAS - BZI_BMI: 27.6 KILOGRAMS/M^2
BH CV ECHO MEAS - BZI_METRIC_HEIGHT: 162.6 CM
BH CV ECHO MEAS - BZI_METRIC_WEIGHT: 73 KG
BH CV LOWER VASCULAR LEFT COMMON FEMORAL AUGMENT: NORMAL
BH CV LOWER VASCULAR LEFT COMMON FEMORAL COMPRESS: NORMAL
BH CV LOWER VASCULAR LEFT COMMON FEMORAL PHASIC: NORMAL
BH CV LOWER VASCULAR LEFT COMMON FEMORAL SPONT: NORMAL
BH CV LOWER VASCULAR LEFT DISTAL FEMORAL AUGMENT: NORMAL
BH CV LOWER VASCULAR LEFT DISTAL FEMORAL COMPRESS: NORMAL
BH CV LOWER VASCULAR LEFT DISTAL FEMORAL PHASIC: NORMAL
BH CV LOWER VASCULAR LEFT DISTAL FEMORAL SPONT: NORMAL
BH CV LOWER VASCULAR LEFT GASTRONEMIUS COMPRESS: NORMAL
BH CV LOWER VASCULAR LEFT GREATER SAPH AK COMPRESS: NORMAL
BH CV LOWER VASCULAR LEFT GREATER SAPH BK COMPRESS: NORMAL
BH CV LOWER VASCULAR LEFT LESSER SAPH COMPRESS: NORMAL
BH CV LOWER VASCULAR LEFT MID FEMORAL AUGMENT: NORMAL
BH CV LOWER VASCULAR LEFT MID FEMORAL COMPRESS: NORMAL
BH CV LOWER VASCULAR LEFT MID FEMORAL PHASIC: NORMAL
BH CV LOWER VASCULAR LEFT MID FEMORAL SPONT: NORMAL
BH CV LOWER VASCULAR LEFT PERONEAL AUGMENT: NORMAL
BH CV LOWER VASCULAR LEFT PERONEAL COMPRESS: NORMAL
BH CV LOWER VASCULAR LEFT POPLITEAL AUGMENT: NORMAL
BH CV LOWER VASCULAR LEFT POPLITEAL COMPRESS: NORMAL
BH CV LOWER VASCULAR LEFT POPLITEAL PHASIC: NORMAL
BH CV LOWER VASCULAR LEFT POPLITEAL SPONT: NORMAL
BH CV LOWER VASCULAR LEFT POSTERIOR TIBIAL AUGMENT: NORMAL
BH CV LOWER VASCULAR LEFT POSTERIOR TIBIAL COMPRESS: NORMAL
BH CV LOWER VASCULAR LEFT PROFUNDA FEMORAL AUGMENT: NORMAL
BH CV LOWER VASCULAR LEFT PROFUNDA FEMORAL PHASIC: NORMAL
BH CV LOWER VASCULAR LEFT PROFUNDA FEMORAL SPONT: NORMAL
BH CV LOWER VASCULAR LEFT PROXIMAL FEMORAL AUGMENT: NORMAL
BH CV LOWER VASCULAR LEFT PROXIMAL FEMORAL COMPRESS: NORMAL
BH CV LOWER VASCULAR LEFT PROXIMAL FEMORAL PHASIC: NORMAL
BH CV LOWER VASCULAR LEFT PROXIMAL FEMORAL SPONT: NORMAL
BH CV LOWER VASCULAR LEFT SAPHENOFEMORAL JUNCTION AUGMENT: NORMAL
BH CV LOWER VASCULAR LEFT SAPHENOFEMORAL JUNCTION COMPRESS: NORMAL
BH CV LOWER VASCULAR LEFT SAPHENOFEMORAL JUNCTION PHASIC: NORMAL
BH CV LOWER VASCULAR LEFT SAPHENOFEMORAL JUNCTION SPONT: NORMAL
BH CV LOWER VASCULAR RIGHT COMMON FEMORAL AUGMENT: NORMAL
BH CV LOWER VASCULAR RIGHT COMMON FEMORAL COMPRESS: NORMAL
BH CV LOWER VASCULAR RIGHT COMMON FEMORAL PHASIC: NORMAL
BH CV LOWER VASCULAR RIGHT COMMON FEMORAL SPONT: NORMAL
BH CV LOWER VASCULAR RIGHT SAPHENOFEMORAL JUNCTION AUGMENT: NORMAL
BH CV LOWER VASCULAR RIGHT SAPHENOFEMORAL JUNCTION COMPRESS: NORMAL
BH CV LOWER VASCULAR RIGHT SAPHENOFEMORAL JUNCTION PHASIC: NORMAL
BH CV LOWER VASCULAR RIGHT SAPHENOFEMORAL JUNCTION SPONT: NORMAL

## 2020-11-04 PROCEDURE — 93971 EXTREMITY STUDY: CPT

## 2020-11-04 PROCEDURE — 93971 EXTREMITY STUDY: CPT | Performed by: INTERNAL MEDICINE

## 2020-11-05 ENCOUNTER — TELEPHONE (OUTPATIENT)
Dept: NEUROSURGERY | Facility: CLINIC | Age: 40
End: 2020-11-05

## 2020-11-05 NOTE — TELEPHONE ENCOUNTER
Patient's lower extremity duplex was negative for DVT.     Attempted to call patient to discuss results. No answer, I left her a VM. Please give her these results if she calls back.     She can proceed with physical therapy and f/u as scheduled.

## 2020-11-17 ENCOUNTER — OFFICE VISIT (OUTPATIENT)
Dept: OBSTETRICS AND GYNECOLOGY | Facility: CLINIC | Age: 40
End: 2020-11-17

## 2020-11-17 VITALS
DIASTOLIC BLOOD PRESSURE: 84 MMHG | HEIGHT: 64 IN | BODY MASS INDEX: 27.66 KG/M2 | WEIGHT: 162 LBS | SYSTOLIC BLOOD PRESSURE: 116 MMHG

## 2020-11-17 DIAGNOSIS — N64.4 MASTODYNIA: Primary | ICD-10-CM

## 2020-11-17 PROCEDURE — 99203 OFFICE O/P NEW LOW 30 MIN: CPT | Performed by: OBSTETRICS & GYNECOLOGY

## 2020-11-17 NOTE — PROGRESS NOTES
"Subjective   Chief Complaint   Patient presents with   • Breast Problem     Gerri Dow is a 40 y.o. year old  presenting to be seen because of bilateral breast tenderness all over breasts.     · Last mammogram: she has never had a mammogram - patient has mammogram scheduled for 2020  · Last breast MRI: she has never had a MRI    OTHER THINGS SHE WANTS TO DISCUSS TODAY:  Nothing else    The following portions of the patient's history were reviewed and updated as appropriate:problem list, current medications, allergies, past family history, past medical history, past social history and past surgical history    Social History    Tobacco Use      Smoking status: Never Smoker      Smokeless tobacco: Never Used    Review of Systems  Constitutional POS: nothing reported    NEG: nothing reported   Genitourinary POS: nothing reported    NEG: nothing reported   Gastointestinal POS: nothing reported    NEG: nothing reported   Integument POS: nothing reported    NEG: nothing reported   Breast POS: breast tenderness    NEG:         Objective   /84   Ht 162.6 cm (64\")   Wt 73.5 kg (162 lb)   LMP 2020   BMI 27.81 kg/m²     General:  well developed; well nourished  no acute distress   Breasts:  Examined in supine position  Symmetric without masses or skin dimpling  Nipples normal without inversion, lesions or discharge  There are no palpable axillary nodes     Lab Review   No data reviewed    Imaging   No data reviewed       Assessment   1. Bilateral breast tenderness     Plan   1. Discussed etiologies for breast tenderness.  No masses on todays exam.  She has a screening mammogram scheduled.  Rec to decrease caffeine.  Wear tight fitting bra and consider Vitamin E or evening Primrose oil.    2. The importance of keeping all planned follow-up and taking all medications as prescribed was emphasized.  Follow up for annual exam next few months.     This note was electronically signed.      " 2020

## 2020-11-19 ENCOUNTER — TELEPHONE (OUTPATIENT)
Dept: NEUROSURGERY | Facility: CLINIC | Age: 40
End: 2020-11-19

## 2020-11-19 DIAGNOSIS — Z98.890 S/P LUMBAR DISCECTOMY: Primary | ICD-10-CM

## 2020-11-19 DIAGNOSIS — M79.605 LEFT LEG PAIN: ICD-10-CM

## 2020-11-19 DIAGNOSIS — M79.662 PAIN OF LEFT CALF: ICD-10-CM

## 2020-11-19 DIAGNOSIS — M51.16 LUMBAR DISC HERNIATION WITH RADICULOPATHY: ICD-10-CM

## 2020-11-19 NOTE — TELEPHONE ENCOUNTER
Provider: Coleman    Caller: Gerri  Time of call: 4431    Phone #: [phone]   Surgery: LUMBAR DISCECTOMY MICRO LEFT L5-S1   Surgery Date: 09/15/2020    Last visit: 11/02/2020     Next visit:     ARABELLA:         Reason for call:           Patient LVM on the Clinical Line -     She is wanting to know about getting a referral to Massage Therapy. She states she forgot to ask Dr. Cee at her last visit.

## 2020-12-09 ENCOUNTER — HOSPITAL ENCOUNTER (OUTPATIENT)
Dept: MAMMOGRAPHY | Facility: HOSPITAL | Age: 40
Discharge: HOME | End: 2020-12-09
Admitting: OBSTETRICS & GYNECOLOGY

## 2020-12-09 DIAGNOSIS — Z12.31 VISIT FOR SCREENING MAMMOGRAM: ICD-10-CM

## 2020-12-09 PROCEDURE — 77067 SCR MAMMO BI INCL CAD: CPT

## 2020-12-09 PROCEDURE — 77067 SCR MAMMO BI INCL CAD: CPT | Performed by: RADIOLOGY

## 2020-12-09 PROCEDURE — 77063 BREAST TOMOSYNTHESIS BI: CPT | Performed by: RADIOLOGY

## 2020-12-09 PROCEDURE — 77063 BREAST TOMOSYNTHESIS BI: CPT

## 2021-01-04 ENCOUNTER — TELEPHONE (OUTPATIENT)
Dept: NEUROSURGERY | Facility: CLINIC | Age: 41
End: 2021-01-04

## 2021-01-04 NOTE — TELEPHONE ENCOUNTER
THE PATIENT CALLED TO ASK IF SHE NEEDS TO COME IN FOR A THREE MONTH FOLLOW UP. SHE WAS LAST SEEN 11/2/20 AND THE OFFICE NOTES STATE TO RETURN IN THREE MONTHS. SHE IS FEELING GOOD AND PHYSICAL THERAPY HAS BEEN SUCCESSFUL. HER PHYSICAL THERAPIST SHYLA LOPEZ WITH [facility] ADVISED HER TO FOLLOW UP WITH  TO ASK IF A FOLLOW UP IS NECESSARY SINCE SHE IS DOING SO WELL. SHE RATES HER CURRENT NERVE PAIN AS 3/10 AND SAID IT'S MANAGEABLE.     PLEASE ADVISE. THE PATIENT CAN BE REACHED [phone]    THANK YOU!

## 2021-05-13 ENCOUNTER — OFFICE VISIT (OUTPATIENT)
Dept: OBSTETRICS AND GYNECOLOGY | Facility: CLINIC | Age: 41
End: 2021-05-13

## 2021-05-13 VITALS
WEIGHT: 162 LBS | BODY MASS INDEX: 27.66 KG/M2 | DIASTOLIC BLOOD PRESSURE: 82 MMHG | HEIGHT: 64 IN | SYSTOLIC BLOOD PRESSURE: 112 MMHG

## 2021-05-13 DIAGNOSIS — Z01.419 WOMEN'S ANNUAL ROUTINE GYNECOLOGICAL EXAMINATION: Primary | ICD-10-CM

## 2021-05-13 DIAGNOSIS — N64.4 BREAST PAIN: ICD-10-CM

## 2021-05-13 DIAGNOSIS — Z12.39 ENCOUNTER FOR BREAST CANCER SCREENING USING NON-MAMMOGRAM MODALITY: ICD-10-CM

## 2021-05-13 PROCEDURE — 99396 PREV VISIT EST AGE 40-64: CPT | Performed by: OBSTETRICS & GYNECOLOGY

## 2021-05-13 NOTE — PROGRESS NOTES
GYN Annual Exam     CC - Here for annual exam.     Subjective   HPI  Gerri Dow is a 40 y.o. female, , who presents for annual well woman exam. Patient's last menstrual period was 2021..  Periods are regular every 25-35 days, lasting 7 days. The patient uses 1 of tampons/pads per hour., lasting 1 days.  Dysmenorrhea:none.  Patient reports problems with: continued breast pain. She had a normal mammogram in 2020.  Partner Status: Marital Status: single.  New Partners since last visit: no.  Desires STD Screening: no.  Patient has not had the HPV vaccine.     Additional OB/GYN History     Current contraception: contraceptive methods: Abstinence. She has never been sexually active,  Desires to: continue contraception  Last Pap : never  Last Completed Pap Smear     This patient has no relevant Health Maintenance data.        History of abnormal Pap smear: no  Family history of uterine, colon, breast, or ovarian cancer: yes - Breast CA-MGM  Previous Mammogram :  yes - 2020  Performs monthly Self-Breast Exam: yes  Exercises Regularly:no  Feelings of Anxiety or Depression: no  Tobacco Usage?: No   OB History        0    Para   0    Term   0            AB   0    Living   0       SAB        TAB        Ectopic        Molar        Multiple        Live Births                    Health Maintenance   Topic Date Due   • ANNUAL PHYSICAL  Never done   • Pneumococcal Vaccine 0-64 (1 of 1 - PPSV23) Never done   • TDAP/TD VACCINES (1 - Tdap) Never done   • HEPATITIS C SCREENING  Never done   • PAP SMEAR  Never done   • INFLUENZA VACCINE  2021   • MAMMOGRAM  2021   • COVID-19 Vaccine  Completed           The additional following portions of the patient's history were reviewed and updated as appropriate: allergies, current medications, past family history, past medical history, past social history, past surgical history and problem list.    Review of Systems   Constitutional: Negative.   "  HENT: Negative.    Eyes: Negative.    Respiratory: Negative.    Cardiovascular: Negative.    Gastrointestinal: Negative.    Endocrine: Negative.    Genitourinary: Negative.  Positive for breast pain.   Musculoskeletal: Negative.    Skin: Negative.    Allergic/Immunologic: Negative.    Neurological: Negative.    Hematological: Negative.    Psychiatric/Behavioral: Negative.        I have reviewed and agree with the HPI, ROS, and historical information as entered above. Shannon Moody MD    Objective   /82   Ht 162.6 cm (64\")   Wt 73.5 kg (162 lb)   LMP 05/02/2021   BMI 27.81 kg/m²     Physical Exam  Vitals and nursing note reviewed. Exam conducted with a chaperone present.   Constitutional:       Appearance: She is well-developed.   HENT:      Head: Normocephalic and atraumatic.   Neck:      Thyroid: No thyroid mass or thyromegaly.   Cardiovascular:      Rate and Rhythm: Normal rate and regular rhythm.      Heart sounds: No murmur heard.     Pulmonary:      Effort: Pulmonary effort is normal. No retractions.      Breath sounds: Normal breath sounds. No wheezing, rhonchi or rales.   Chest:      Chest wall: No mass or tenderness.      Breasts:         Right: Normal. No mass, nipple discharge, skin change or tenderness.         Left: Normal. No mass, nipple discharge, skin change or tenderness.   Abdominal:      General: Bowel sounds are normal.      Palpations: Abdomen is soft. Abdomen is not rigid. There is no mass.      Tenderness: There is no abdominal tenderness. There is no guarding.      Hernia: No hernia is present. There is no hernia in the left inguinal area.   Genitourinary:     Labia:         Right: No rash, tenderness or lesion.         Left: No rash, tenderness or lesion.       Vagina: Normal. No vaginal discharge or lesions.      Cervix: No cervical motion tenderness, discharge, lesion or cervical bleeding.      Uterus: Normal. Not enlarged, not fixed and not tender.       Adnexa:         " Right: No mass or tenderness.          Left: No mass or tenderness.        Rectum: No external hemorrhoid.   Musculoskeletal:      Cervical back: Normal range of motion. No muscular tenderness.   Neurological:      Mental Status: She is alert and oriented to person, place, and time.   Psychiatric:         Behavior: Behavior normal.         Assessment/Plan       Encounter Diagnoses   Name Primary?   • Women's annual routine gynecological examination Yes   • Encounter for breast cancer screening using non-mammogram modality    • Breast pain        Plan     1. Recommended use of Vitamin D replacement and getting adequate calcium in her diet. (1500mg)  2. Reviewed monthly self breast exams.  Instructed to call with lumps, pain, or breast discharge.    3. Continue yearly mammography  4. Reviewed HPV guidelines.  5. Reviewed exercise as a preventative health measures.    6. Fibrocystic changes -discussed being sure she is reducing caffeine intake and methylxanthines in general.  Recommended starting vitamin E and evening primrose oil in addition to a tight fitting bra.  Recent mammography in December was normal and clinical breast exam is unremarkable.    7. She was able to tolerate a pap today  8. HMB - discussed options if this persists and info given on [facility].  She has headaches with visual component and not a candidate for ALLIE.      Shannon Moody MD  05/13/2021

## 2021-05-20 DIAGNOSIS — Z01.419 WOMEN'S ANNUAL ROUTINE GYNECOLOGICAL EXAMINATION: ICD-10-CM

## 2021-06-21 ENCOUNTER — TELEPHONE (OUTPATIENT)
Dept: NEUROSURGERY | Facility: CLINIC | Age: 41
End: 2021-06-21

## 2021-06-21 RX ORDER — METHYLPREDNISOLONE 4 MG/1
TABLET ORAL
Qty: 21 TABLET | Refills: 0 | Status: SHIPPED | OUTPATIENT
Start: 2021-06-21 | End: 2022-03-14 | Stop reason: SDUPTHER

## 2021-06-21 NOTE — TELEPHONE ENCOUNTER
Please let patient know that these things happen.  It is hard to know exactly what caused it.  Hopefully the Medrol Dosepak helps with the flareup.  Please have patient call after she has completed to determine next appropriate steps.

## 2021-06-21 NOTE — TELEPHONE ENCOUNTER
Spoke to patient and relayed PA's message. Patient is wondering what might have caused the flare up to happen. Pt states that it may be caused by her walking more now that the weather is nice. Pt states that it may caused by her shoes wearing out. Pt wants to know if the flare-up is normal?

## 2021-06-21 NOTE — TELEPHONE ENCOUNTER
"Provider:  Coleman  Caller: Patient  Time of call:   4:07 call back  Phone #:  [phone]   Surgery:  LUMBAR DISCECTOMY MICRO LEFT L5-S1  Surgery Date:  09/15/2020  Last visit:   11/02/2020  Next visit:  VIRI BELL:         Reason for call:   Patient called [facility] stating she had a Discectomy with Dr. Cee back in September. Patient stated she has been having a \"flare up\" and wanted to talk to someone regarding this.     I called and talked to the patient. Patient stated over the last two weeks she has started having pain in her Left Lower leg. Patient stated pain in the hamstring, and calf. Patient stated this pain has not made it to the \"Wilberto horse stage, but is almost there.\" Patient stated she had been walking again when the weather became jacob, and she thinks she has flared her back up.     Patient stated she has since stopped walking, and is now taking Naproxen to help with her leg pain.     Patient wanting to know if she needs a steroid pack to help calm the symptoms, or if she needs to be seen again.         "

## 2021-07-02 ENCOUNTER — TELEPHONE (OUTPATIENT)
Dept: NEUROSURGERY | Facility: CLINIC | Age: 41
End: 2021-07-02

## 2021-07-02 NOTE — TELEPHONE ENCOUNTER
"Provider:  Coleman  Caller: Patient  Time of call:   4:43  Phone #:  [phone]  Surgery:  Lumbar discectomy  Surgery Date:  09/15/20  Last visit:   11/02/20  Next visit:     ARABELLA:         Reason for call:    Patient states she finished her steroid pack last Sunday. She feels there is some similarities in when she has her menstrual cycle and when her \"imflamation flare ups\" and states she wished it was discussed more along women.    She is feeling much better.     She wants to know if she can go swimming.    "

## 2021-09-15 ENCOUNTER — TELEPHONE (OUTPATIENT)
Dept: OBSTETRICS AND GYNECOLOGY | Facility: CLINIC | Age: 41
End: 2021-09-15

## 2021-09-15 DIAGNOSIS — N92.4 EXCESSIVE BLEEDING IN PREMENOPAUSAL PERIOD: Primary | ICD-10-CM

## 2021-09-15 NOTE — TELEPHONE ENCOUNTER
Pt stated that karimi wants to do ablation and regular periods, but clot amounts have gotten more.This has been going on for 6 mo. Please advise.

## 2021-09-15 NOTE — TELEPHONE ENCOUNTER
Patient states the clotting has gotten worse, not necessarily the bleeding. LMP 9/9    See office visit note from 5/2021; patient was instructed to call if bleeding/clots got worse.    Appointment scheduled for tomorrow; U/S at 9am.    Order for U/S on chart.

## 2021-09-16 ENCOUNTER — OFFICE VISIT (OUTPATIENT)
Dept: OBSTETRICS AND GYNECOLOGY | Facility: CLINIC | Age: 41
End: 2021-09-16

## 2021-09-16 VITALS
DIASTOLIC BLOOD PRESSURE: 72 MMHG | SYSTOLIC BLOOD PRESSURE: 115 MMHG | BODY MASS INDEX: 28.68 KG/M2 | WEIGHT: 168 LBS | HEIGHT: 64 IN

## 2021-09-16 DIAGNOSIS — N92.0 MENORRHAGIA WITH REGULAR CYCLE: Primary | ICD-10-CM

## 2021-09-16 DIAGNOSIS — D25.1 INTRAMURAL LEIOMYOMA OF UTERUS: ICD-10-CM

## 2021-09-16 PROCEDURE — 99213 OFFICE O/P EST LOW 20 MIN: CPT | Performed by: OBSTETRICS & GYNECOLOGY

## 2021-10-05 ENCOUNTER — OFFICE VISIT (OUTPATIENT)
Dept: OBSTETRICS AND GYNECOLOGY | Facility: CLINIC | Age: 41
End: 2021-10-05

## 2021-10-05 VITALS
HEIGHT: 64 IN | WEIGHT: 169 LBS | SYSTOLIC BLOOD PRESSURE: 112 MMHG | DIASTOLIC BLOOD PRESSURE: 74 MMHG | BODY MASS INDEX: 28.85 KG/M2

## 2021-10-05 DIAGNOSIS — N93.9 ABNORMAL UTERINE BLEEDING (AUB): Primary | ICD-10-CM

## 2021-10-05 PROCEDURE — 99213 OFFICE O/P EST LOW 20 MIN: CPT | Performed by: OBSTETRICS & GYNECOLOGY

## 2021-10-05 NOTE — PATIENT INSTRUCTIONS
Endometrial Ablation, Care After  This sheet gives you information about how to care for yourself after your procedure. Your health care provider may also give you more specific instructions. If you have problems or questions, contact your health care provider.  What can I expect after the procedure?  After the procedure, it is common to have:  · A need to urinate more frequently than usual for the first 24 hours.  · Cramps similar to menstrual cramps. These may last for 1-2 days.  · Thin, watery vaginal discharge that is light pink or brown in color. This may last a few weeks. Discharge will be heavy for the first few days after your procedure. You may need to wear a sanitary pad.  · Nausea.  · Vaginal bleeding for 4-6 weeks after the procedure, as tissue healing occurs.  Follow these instructions at home:  Activity  · Do not drive for 24 hours if you were given amedicine to help you relax (sedative) during your procedure.  · Do not have sex or put anything into your vagina until your health care provider approves.  · Do not lift anything that is heavier than 10 lb (4.5 kg), or the limit that you are told, until your health care provider says that it is safe.  · Return to your normal activities as told by your health care provider. Ask your health care provider what activities are safe for you.  General instructions    · Take over-the-counter and prescription medicines only as told by your health care provider.  · Do not take baths, swim, or use a hot tub until your health care provider approves. You will be able to take showers.  · Check your vaginal area every day for signs of infection. Check for:  ? Redness, swelling, or pain.  ? More discharge or blood, instead of less.  ? Bad-smelling discharge.  · Keep all follow-up visits as told by your health care provider. This is important.  · Drink enough fluid to keep your urine pale yellow.    Contact a health care provider if you have:  · Vaginal redness, swelling,  or pain.  · Vaginal discharge or bleeding that gets worse instead of getting better.  · Bad-smelling vaginal discharge.  · A fever or chills.  · Trouble urinating.  Get help right away if you have:  · Heavy vaginal bleeding.  · Severe cramps.  Summary  · After endometrial ablation, it is normal to have thin, watery vaginal discharge that is light pink or brown in color. This may last a few weeks and may be heavier right after the procedure.  · Vaginal bleeding is also normal after the procedure and should get better with time.  · Check your vaginal area every day for signs of infection, such as bad-smelling discharge.  · Keep all follow-up visits as told by your health care provider. This is important.  This information is not intended to replace advice given to you by your health care provider. Make sure you discuss any questions you have with your health care provider.  Document Revised: 01/21/2021 Document Reviewed: 10/30/2018  [employer] Patient Education © 2021 [employer]  Endometrial Ablation  Endometrial ablation is a procedure that destroys the thin inner layer of the lining of the uterus (endometrium). This procedure may be done:  · To stop heavy periods.  · To stop bleeding that is causing anemia.  · To control irregular bleeding.  · To treat bleeding caused by small tumors (fibroids) in the endometrium.  This procedure is often an alternative to major surgery, such as removal of the uterus and cervix (hysterectomy). As a result of this procedure:  · You may not be able to have children. However, if you are premenopausal (you have not gone through menopause):  ? You may still have a small chance of getting pregnant.  ? You will need to use a reliable method of birth control after the procedure to prevent pregnancy.  · You may stop having a menstrual period, or you may have only a small amount of bleeding during your period. Menstruation may return several years after the procedure.  Tell a health care  provider about:  · Any allergies you have.  · All medicines you are taking, including vitamins, herbs, eye drops, creams, and over-the-counter medicines.  · Any problems you or family members have had with the use of anesthetic medicines.  · Any blood disorders you have.  · Any surgeries you have had.  · Any medical conditions you have.  What are the risks?  Generally, this is a safe procedure. However, problems may occur, including:  · A hole (perforation) in the uterus or bowel.  · Infection of the uterus, bladder, or vagina.  · Bleeding.  · Damage to other structures or organs.  · An air bubble in the lung (air embolus).  · Problems with pregnancy after the procedure.  · Failure of the procedure.  · Decreased ability to diagnose cancer in the endometrium.  What happens before the procedure?  · You will have tests of your endometrium to make sure there are no pre-cancerous cells or cancer cells present.  · You may have an ultrasound of the uterus.  · You may be given medicines to thin the endometrium.  · Ask your health care provider about:  ? Changing or stopping your regular medicines. This is especially important if you take diabetes medicines or blood thinners.  ? Taking medicines such as aspirin and ibuprofen. These medicines can thin your blood. Do not take these medicines before your procedure if your doctor tells you not to.  · Plan to have someone take you home from the hospital or clinic.  What happens during the procedure?    · You will lie on an exam table with your feet and legs supported as in a pelvic exam.  · To lower your risk of infection:  ? Your health care team will wash or sanitize their hands and put on germ-free (sterile) gloves.  ? Your genital area will be washed with soap.  · An IV tube will be inserted into one of your veins.  · You will be given a medicine to help you relax (sedative).  · A surgical instrument with a light and camera (resectoscope) will be inserted into your vagina and  moved into your uterus. This allows your surgeon to see inside your uterus.  · Endometrial tissue will be removed using one of the following methods:  ? Radiofrequency. This method uses a radiofrequency-alternating electric current to remove the endometrium.  ? Cryotherapy. This method uses extreme cold to freeze the endometrium.  ? Heated-free liquid. This method uses a heated saltwater (saline) solution to remove the endometrium.  ? Microwave. This method uses high-energy microwaves to heat up the endometrium and remove it.  ? Thermal balloon. This method involves inserting a catheter with a balloon tip into the uterus. The balloon tip is filled with heated fluid to remove the endometrium.  The procedure may vary among health care providers and hospitals.  What happens after the procedure?  · Your blood pressure, heart rate, breathing rate, and blood oxygen level will be monitored until the medicines you were given have worn off.  · As tissue healing occurs, you may notice vaginal bleeding for 4-6 weeks after the procedure. You may also experience:  ? Cramps.  ? Thin, watery vaginal discharge that is light pink or brown in color.  ? A need to urinate more frequently than usual.  ? Nausea.  · Do not drive for 24 hours if you were given a sedative.  · Do not have sex or insert anything into your vagina until your health care provider approves.  Summary  · Endometrial ablation is done to treat the many causes of heavy menstrual bleeding.  · The procedure may be done only after medications have been tried to control the bleeding.  · Plan to have someone take you home from the hospital or clinic.  This information is not intended to replace advice given to you by your health care provider. Make sure you discuss any questions you have with your health care provider.  Document Revised: 06/04/2019 Document Reviewed: 01/04/2018  Elsevier Patient Education © 2021 [employer]

## 2021-10-05 NOTE — PROGRESS NOTES
Gynecologic Preoperative Exam Note        Subjective   Gerri Dow is a 41 y.o. year old  who is scheduled  for surgery due to menorrhagia.  She is scheduled for hysteroscopy, D&C, Novasure ablation at [facility].  Her surgery is scheduled for 10/11/21 at 0930.   Her LMP was current.  Her birth control method is female partners,  Her BMI is 28.84.      She has reviewed the informational video on D&C.    She has reviewed the informational pamphlet on endometrial ablation.    She understands the risks of bleeding, infection, possible damage to other organ systems, including but not limited to the gastrointestinal tract and genitourinary tract.  She also understands the specific risks listed in the preop information (video, pamphlets, etc.).    She has reviewed and signed the preop consent form.    She has been instructed to have a light dinner the night before surgery, then nothing to eat or drink after midnight.  The day of surgery do not chew gum or smoke.  Remove all jewelry, nail polish, contact lenses prior to coming to the hospital.  Do not bring valuables or large sums of money with you. Patient was instructed on what time to arrive and where to check in, maps were given.  She was instructed that she will meet an Anesthesiologist and that an IV will be started to provide fluids and sedation.  The total time of procedure was discussed.  She was instructed that she will need a .      She has confirmed that she is no allergic to Latex.       Past Medical History:   Diagnosis Date   • Anxiety    • Asthma      Past Surgical History:   Procedure Laterality Date   • LUMBAR DISCECTOMY Left 9/15/2020    Procedure: LUMBAR DISCECTOMY MICRO LEFT L5-S1;  Surgeon: Billy Cee MD;  Location: [facility];  Service: Neurosurgery;  Laterality: Left;   • CYST REMOVAL      right shoulder   • TONSILLECTOMY       OB History    Para Term  AB Living   0 0 0 0 0 0   SAB TAB Ectopic Molar  "Multiple Live Births   0 0 0 0 0 0     Social History     Tobacco Use   Smoking Status Never Smoker   Smokeless Tobacco Never Used     Social History     Substance and Sexual Activity   Alcohol Use Yes    Comment: 2 or less drinks a week     Social History     Substance and Sexual Activity   Drug Use Yes   • Types: Marijuana    Comment: Every couple days     Prior to Admission medications    Medication Sig Start Date End Date Taking? Authorizing Provider   acetaminophen (TYLENOL) 500 MG tablet Take 1,000 mg by mouth Every 6 (Six) Hours As Needed for Mild Pain .    Provider, MD Roxy   albuterol sulfate  (90 Base) MCG/ACT inhaler Inhale 2 puffs Every 6 (Six) Hours As Needed for Wheezing or Shortness of Air. 5/5/20   Jacob Ayala PA   methylPREDNISolone (MEDROL) 4 MG dose pack Take as directed on package instructions. 6/21/21   Marianne Velazquez PA-C     No Known Allergies    Review of Systems   Constitutional: Negative.    HENT: Negative.    Eyes: Negative.    Respiratory: Negative.    Cardiovascular: Negative.    Gastrointestinal: Negative.    Endocrine: Negative.    Genitourinary: Positive for menstrual problem and vaginal bleeding.   Musculoskeletal: Negative.    Skin: Negative.    Allergic/Immunologic: Negative.    Neurological: Negative.    Hematological: Negative.    Psychiatric/Behavioral: Negative.          Objective   /74   Ht 162.6 cm (64\")   Wt 76.7 kg (169 lb)   LMP 09/09/2021   BMI 29.01 kg/m²   General: well developed; well nourished  no acute distress   Heart: regular rate and rhythm, S1, S2 normal, no murmur, click, rub or gallop   Lungs: breathing is unlabored   Abdomen: soft, non-tender; no masses   Pelvis: Not performed.        Assessment   Problems Addressed this Visit     None      Visit Diagnoses     Abnormal uterine bleeding (AUB)    -  Primary      Diagnoses       Codes Comments    Abnormal uterine bleeding (AUB)    -  Primary ICD-10-CM: N93.9  ICD-9-CM: 626.9     "            Plan   1. Plan on dx hysteroscopy D&C Novasure ablation  2. Risks of surgery were reviewed with the patient including risks of bleeding, infection, damage to other organ systems including, but not limited to GI and  tracts (bowel, bladder, blood vessels, nerves) risks of Anesthesia, as well as the risk the surgery will not produce the desired results, possible need for additional surgery, death, risk of uterine perforation.  3. Understands increased lifetime need for hysterectomy after ablation.          Shannon Moody MD  10/5/2021

## 2021-10-11 ENCOUNTER — LAB REQUISITION (OUTPATIENT)
Dept: LAB | Facility: HOSPITAL | Age: 41
End: 2021-10-11

## 2021-10-11 ENCOUNTER — OUTSIDE FACILITY SERVICE (OUTPATIENT)
Dept: OBSTETRICS AND GYNECOLOGY | Facility: CLINIC | Age: 41
End: 2021-10-11

## 2021-10-11 DIAGNOSIS — N92.4 EXCESSIVE BLEEDING IN THE PREMENOPAUSAL PERIOD: ICD-10-CM

## 2021-10-11 PROCEDURE — 58563 HYSTEROSCOPY ABLATION: CPT | Performed by: OBSTETRICS & GYNECOLOGY

## 2021-10-11 PROCEDURE — 88305 TISSUE EXAM BY PATHOLOGIST: CPT | Performed by: OBSTETRICS & GYNECOLOGY

## 2021-10-12 LAB
CYTO UR: NORMAL
LAB AP CASE REPORT: NORMAL
LAB AP CLINICAL INFORMATION: NORMAL
PATH REPORT.FINAL DX SPEC: NORMAL
PATH REPORT.GROSS SPEC: NORMAL

## 2021-11-18 ENCOUNTER — TELEPHONE (OUTPATIENT)
Dept: OBSTETRICS AND GYNECOLOGY | Facility: CLINIC | Age: 41
End: 2021-11-18

## 2021-11-18 DIAGNOSIS — Z11.3 SCREENING FOR STD (SEXUALLY TRANSMITTED DISEASE): Primary | ICD-10-CM

## 2021-11-18 DIAGNOSIS — Z12.31 ENCOUNTER FOR SCREENING MAMMOGRAM FOR BREAST CANCER: ICD-10-CM

## 2021-11-18 NOTE — TELEPHONE ENCOUNTER
Patient is requesting to have a screening mammogram order put in so that she is able to schedule with Yarsani

## 2021-12-14 ENCOUNTER — HOSPITAL ENCOUNTER (OUTPATIENT)
Dept: MAMMOGRAPHY | Facility: HOSPITAL | Age: 41
Discharge: HOME | End: 2021-12-14
Admitting: OBSTETRICS & GYNECOLOGY

## 2021-12-14 DIAGNOSIS — Z12.31 ENCOUNTER FOR SCREENING MAMMOGRAM FOR BREAST CANCER: ICD-10-CM

## 2021-12-14 PROCEDURE — 77063 BREAST TOMOSYNTHESIS BI: CPT

## 2021-12-14 PROCEDURE — 77067 SCR MAMMO BI INCL CAD: CPT

## 2021-12-14 PROCEDURE — 77067 SCR MAMMO BI INCL CAD: CPT | Performed by: RADIOLOGY

## 2021-12-14 PROCEDURE — 77063 BREAST TOMOSYNTHESIS BI: CPT | Performed by: RADIOLOGY

## 2021-12-30 ENCOUNTER — APPOINTMENT (OUTPATIENT)
Dept: MAMMOGRAPHY | Facility: HOSPITAL | Age: 41
End: 2021-12-30

## 2022-01-18 ENCOUNTER — OFFICE VISIT (OUTPATIENT)
Dept: OBSTETRICS AND GYNECOLOGY | Facility: CLINIC | Age: 42
End: 2022-01-18

## 2022-01-18 VITALS
HEIGHT: 64 IN | BODY MASS INDEX: 28.85 KG/M2 | WEIGHT: 169 LBS | DIASTOLIC BLOOD PRESSURE: 72 MMHG | SYSTOLIC BLOOD PRESSURE: 120 MMHG

## 2022-01-18 DIAGNOSIS — N64.4 MASTODYNIA OF LEFT BREAST: Primary | ICD-10-CM

## 2022-01-18 DIAGNOSIS — R14.0 BLOATING: ICD-10-CM

## 2022-01-18 PROCEDURE — 99214 OFFICE O/P EST MOD 30 MIN: CPT | Performed by: OBSTETRICS & GYNECOLOGY

## 2022-01-18 NOTE — PROGRESS NOTES
Chief Complaint   Patient presents with   • Breast Pain       Subjective     HPI  Gerri Dow is a 41 y.o. female, , who presents with breast complaints of breast pain.  This is present in left breast(s).    She states she has experienced this problem for 1 year.  She describes the severity as mild and constant.  She states that the problem has not changed since she discovered it. The patient denies breast lump and nipple discharge. She has had a mammogram before. Most recent mammogram 21 and is scheduled for Dx mamm 22. She does have a family history of breast cancer in her maternal grandmother. They were diagnosed at age 60's, maternal aunt at age 65.     Patient's last menstrual period was 2022..  Periods are regular every 28-30 days.    Current contraception: contraceptive methods: None    Last mammogram:   Last Completed Mammogram          Scheduled - MAMMOGRAM (Yearly) Scheduled for 2021  Mammo Screening Digital Tomosynthesis Bilateral With CAD    2020  Mammo Screening Digital Tomosynthesis Bilateral With CAD              Tobacco Usage?: No     OB History        0    Para   0    Term   0       0    AB   0    Living   0       SAB   0    IAB   0    Ectopic   0    Molar   0    Multiple   0    Live Births   0                Past Medical History:   Diagnosis Date   • Anxiety    • Asthma        Past Surgical History:   Procedure Laterality Date   • CYST REMOVAL      right shoulder   • LUMBAR DISCECTOMY Left 9/15/2020    Procedure: LUMBAR DISCECTOMY MICRO LEFT L5-S1;  Surgeon: Billy Cee MD;  Location: [facility];  Service: Neurosurgery;  Laterality: Left;   • TONSILLECTOMY         Family History   Problem Relation Age of Onset   • Heart disease Mother    • Hypertension Mother    • Diabetes Mother    • Hyperlipidemia Mother    • Hyperlipidemia Father    • Hypertension Father    • Depression Father    • Other Sister         Nonhodgkins  "lymphoma   • No Known Problems Brother         Smoker   • Breast cancer Maternal Aunt 50          Review of Systems   Constitutional: Negative.    HENT: Negative.    Respiratory: Negative.    Cardiovascular: Negative.    Gastrointestinal: Negative.    Genitourinary: Negative.  Positive for breast pain.   Musculoskeletal: Negative.    Skin: Negative.    Allergic/Immunologic: Negative.    Neurological: Negative.    Hematological: Negative.    Psychiatric/Behavioral: Negative.        I have reviewed and agree with the HPI, ROS, and historical information as entered above. Shannon Moody MD    Objective   /72   Ht 162.6 cm (64\")   Wt 76.7 kg (169 lb)   LMP 01/17/2022   BMI 29.01 kg/m²     Physical Exam  Vitals and nursing note reviewed. Exam conducted with a chaperone present.   Constitutional:       Appearance: She is well-developed.   HENT:      Head: Normocephalic.   Eyes:      Conjunctiva/sclera: Conjunctivae normal.   Pulmonary:      Effort: Pulmonary effort is normal. No retractions.   Chest:      Chest wall: No mass.   Breasts:      Right: No mass, nipple discharge, skin change or tenderness.      Left: No mass, nipple discharge, skin change or tenderness.       Psychiatric:         Behavior: Behavior normal.           Assessment/Plan     Encounter Diagnoses   Name Primary?   • Mastodynia of left breast Yes   • Bloating            Plan     1. Left breast pain- she has tried the measures we discussed in the past.  She is having a dx mammogram soon.  She notes it radiates toward her ribs and has a h/o back surgery.  She is wanting a breast MRI and discussed that will be determined based on her dx mamm. Results.    2.  Bloating - she watch a special on ovarian cancer and has many of the symptoms.  Will return for u/s. Discussed the pitfalls of and lack of approved modalities for ovarian cancer screening.    3.  Concern for allergies causing asthma - rec. Seeing allergist.       Shannon Moody, " MD  01/18/2022

## 2022-01-28 ENCOUNTER — HOSPITAL ENCOUNTER (OUTPATIENT)
Dept: MAMMOGRAPHY | Facility: HOSPITAL | Age: 42
Discharge: HOME | End: 2022-01-28
Admitting: RADIOLOGY

## 2022-01-28 DIAGNOSIS — R92.8 ABNORMAL MAMMOGRAM: ICD-10-CM

## 2022-01-28 PROCEDURE — 77065 DX MAMMO INCL CAD UNI: CPT | Performed by: RADIOLOGY

## 2022-01-28 PROCEDURE — G0279 TOMOSYNTHESIS, MAMMO: HCPCS

## 2022-01-28 PROCEDURE — 77065 DX MAMMO INCL CAD UNI: CPT

## 2022-01-28 PROCEDURE — 77061 BREAST TOMOSYNTHESIS UNI: CPT | Performed by: RADIOLOGY

## 2022-02-16 ENCOUNTER — HOSPITAL ENCOUNTER (OUTPATIENT)
Dept: CARDIOLOGY | Facility: HOSPITAL | Age: 42
Discharge: HOME | End: 2022-02-16
Admitting: OTOLARYNGOLOGY

## 2022-02-16 ENCOUNTER — TRANSCRIBE ORDERS (OUTPATIENT)
Dept: CARDIOLOGY | Facility: HOSPITAL | Age: 42
End: 2022-02-16

## 2022-02-16 DIAGNOSIS — R00.0 TACHYCARDIA: ICD-10-CM

## 2022-02-16 DIAGNOSIS — R00.0 TACHYCARDIA: Primary | ICD-10-CM

## 2022-02-16 LAB
QT INTERVAL: 366 MS
QTC INTERVAL: 440 MS

## 2022-02-16 PROCEDURE — 93005 ELECTROCARDIOGRAM TRACING: CPT | Performed by: OTOLARYNGOLOGY

## 2022-02-16 PROCEDURE — 93010 ELECTROCARDIOGRAM REPORT: CPT | Performed by: INTERNAL MEDICINE

## 2022-03-14 ENCOUNTER — TELEPHONE (OUTPATIENT)
Dept: NEUROSURGERY | Facility: CLINIC | Age: 42
End: 2022-03-14

## 2022-03-14 ENCOUNTER — TELEPHONE (OUTPATIENT)
Dept: OBSTETRICS AND GYNECOLOGY | Facility: CLINIC | Age: 42
End: 2022-03-14

## 2022-03-14 RX ORDER — METHYLPREDNISOLONE 4 MG/1
TABLET ORAL
Qty: 21 TABLET | Refills: 0 | Status: SHIPPED | OUTPATIENT
Start: 2022-03-14 | End: 2022-11-04

## 2022-03-14 NOTE — TELEPHONE ENCOUNTER
Yes that's fine. Sent to pharmacy. Please have her call with update after completing and if still having pain we can see her back in office for monica

## 2022-03-14 NOTE — TELEPHONE ENCOUNTER
Provider:  Coleman  Caller: patient  Time of call:   11:43  Phone #:  [phone]  Surgery:  Lumbar Discectomy  Surgery Date:  9-  Last visit:  11-   Next visit: sharonda BELL:         Reason for call: Patient called and said that she  was having more pain and wanted to know if she could have something like a steroid to calm things down. She cannot take Gabapentin because it misses with her mind. Please Advise. Thank you.    When did it start: 10 days ago    Where is it located:left knee and calf    How long has this been going on/is this new or the same as before surgery: 10 days, different    Characteristics of symptom/severity: nerve pain, tingling, pinching.    Timing- Is it constant or intermittent:  constant    What makes it worse:  If she walks to much    What makes it better: When she walks around    What therapies/medications have you tried: Ibuprofen, Naproxen

## 2022-03-17 ENCOUNTER — OFFICE VISIT (OUTPATIENT)
Dept: OBSTETRICS AND GYNECOLOGY | Facility: CLINIC | Age: 42
End: 2022-03-17

## 2022-03-17 VITALS — WEIGHT: 164 LBS | BODY MASS INDEX: 28 KG/M2 | HEIGHT: 64 IN

## 2022-03-17 DIAGNOSIS — R14.0 BLOATING: Primary | ICD-10-CM

## 2022-03-17 DIAGNOSIS — Z98.890 STATUS POST ENDOMETRIAL ABLATION: ICD-10-CM

## 2022-03-17 PROCEDURE — 99214 OFFICE O/P EST MOD 30 MIN: CPT | Performed by: OBSTETRICS & GYNECOLOGY

## 2022-03-17 NOTE — PROGRESS NOTES
Chief Complaint   Patient presents with   • Follow-up     bloating       Subjective   HPI  Gerri Dow is a 41 y.o. female, , who presents for follow up ultrasound for bloating.  She had an endometrial ablation in 10/2021 and states she is still having periods every month and lasting 8 days. States flow is moderate and changing pads every 2-3 hours. The dysmenorrhea improved.      Additional OB/GYN History   Current contraception: contraceptive methods: None  Desires to: do not start contraception  Last Pap :   Last Completed Pap Smear          Ordered - PAP SMEAR (Every 3 Years) Ordered on 2021  SCANNED - PAP SMEAR              History of abnormal Pap smear: no  Last mammogram:   Last Completed Mammogram          MAMMOGRAM (Yearly) Next due on 2022  Mammo Diagnostic Digital Tomosynthesis Left With CAD    2021  Mammo Screening Digital Tomosynthesis Bilateral With CAD    2020  Mammo Screening Digital Tomosynthesis Bilateral With CAD              Tobacco Usage?: No   OB History        0    Para   0    Term   0       0    AB   0    Living   0       SAB   0    IAB   0    Ectopic   0    Molar   0    Multiple   0    Live Births   0                Health Maintenance   Topic Date Due   • ANNUAL PHYSICAL  Never done   • Pneumococcal Vaccine 0-64 (1 of 2 - PPSV23) Never done   • HEPATITIS C SCREENING  Never done   • TDAP/TD VACCINES (2 - Td or Tdap) 2021   • Annual Gynecologic Pelvic and Breast Exam  2022   • MAMMOGRAM  2023   • PAP SMEAR  2024   • COVID-19 Vaccine  Completed   • INFLUENZA VACCINE  Completed       The additional following portions of the patient's history were reviewed and updated as appropriate: allergies, current medications, past family history, past medical history, past social history, past surgical history and problem list.    Review of Systems   Constitutional: Negative.    HENT: Negative.   "  Respiratory: Negative.    Cardiovascular: Negative.    Gastrointestinal: Positive for abdominal distention (bloating).   Genitourinary: Positive for menstrual problem.   Musculoskeletal: Negative.    Skin: Negative.    Allergic/Immunologic: Negative.    Neurological: Negative.    Hematological: Negative.    Psychiatric/Behavioral: Negative.        I have reviewed and agree with the HPI, ROS, and historical information as entered above. Shannon Moody MD    Objective   Ht 162.6 cm (64\")   Wt 74.4 kg (164 lb)   LMP 03/17/2022   BMI 28.15 kg/m²     Physical Exam  Constitutional:       Appearance: She is well-developed.   HENT:      Head: Normocephalic.   Eyes:      Conjunctiva/sclera: Conjunctivae normal.   Pulmonary:      Effort: Pulmonary effort is normal.   Psychiatric:         Behavior: Behavior normal.         Assessment/Plan     Encounter Diagnoses   Name Primary?   • Bloating Yes   • Status post endometrial ablation        Plan     1.  Bloating- u/s shows normal adnexa and stable fibroid.  No etiology for bloating on todays scan.  2.  S/P endometrial ablation- she is having a regular cycle.  She is not wanting to consider a hysterectomy at this time.  3.  Discussed current partner situation and emotional toll her partners cancer has taken on her and her partner.  She is in therapy and doing the right things to try to get things back on track.      Total time spent today with Gerri  was 30-39 minutes (level 4).  Off this time, 80% was spent face-to-face time coordinating care, answering her questions and counseling regarding .      Shannon Moody MD  03/17/2022  "

## 2022-03-28 NOTE — TELEPHONE ENCOUNTER
"Patient called to give an update. Patient stated she still has a \"pulling\" feeling in the back of her leg, and she has been sleeping on the couch for the last 3 weeks.     Patient stated she has never been 100% since surgery, and has stayed around the 80% birdie. Patient stated she is trying hard to not be negative, but she is not sure what to do at this point.     Is it ok to set patient up eval with PA?  "

## 2022-04-12 ENCOUNTER — TELEPHONE (OUTPATIENT)
Dept: NEUROSURGERY | Facility: CLINIC | Age: 42
End: 2022-04-12

## 2022-04-12 NOTE — TELEPHONE ENCOUNTER
Provider:   Quinton  Caller: patient  Time of call:  1:06   Phone #:  [phone]  Surgery:  Lumbar Discectomy micro left L5-S1  Surgery Date:  9-  Last visit: 11-    Next visit:     ARABELLA:         Reason for call: Patient stated she has taken all the steroids, and is still having pain. She wants to know what else she can do? Please Advise. Thank you.     I have tried to call the patient back no answer, left VM for her to return the call.  Patient returned call and said that she had been out walking an her left knee gave out on her, she thinks it is coming from her back. Please Advise. Thank you.    When did it start: a month ago    Where is it located: back left side left knee gives out on her    How long has this been going on/is this new or the same as before surgery:  A month different    Characteristics of symptom/severity: numbness in her toes,      Timing- Is it constant or intermittent: intermittent    What makes it worse:no    What makes it better:no    What therapies/medications have you tried:   Steroids and Naproxen and ibuprofen

## 2022-04-15 ENCOUNTER — OFFICE VISIT (OUTPATIENT)
Dept: NEUROSURGERY | Facility: CLINIC | Age: 42
End: 2022-04-15

## 2022-04-15 ENCOUNTER — HOSPITAL ENCOUNTER (OUTPATIENT)
Dept: GENERAL RADIOLOGY | Facility: HOSPITAL | Age: 42
Discharge: HOME | End: 2022-04-15
Admitting: PHYSICIAN ASSISTANT

## 2022-04-15 DIAGNOSIS — M25.562 CHRONIC PAIN OF LEFT KNEE: ICD-10-CM

## 2022-04-15 DIAGNOSIS — G89.29 CHRONIC PAIN OF LEFT KNEE: ICD-10-CM

## 2022-04-15 DIAGNOSIS — M54.42 CHRONIC BILATERAL LOW BACK PAIN WITH LEFT-SIDED SCIATICA: Primary | ICD-10-CM

## 2022-04-15 DIAGNOSIS — G89.29 CHRONIC BILATERAL LOW BACK PAIN WITH LEFT-SIDED SCIATICA: Primary | ICD-10-CM

## 2022-04-15 PROCEDURE — 73562 X-RAY EXAM OF KNEE 3: CPT

## 2022-04-15 PROCEDURE — 99214 OFFICE O/P EST MOD 30 MIN: CPT | Performed by: PHYSICIAN ASSISTANT

## 2022-04-15 NOTE — PROGRESS NOTES
Subjective     Chief Complaint: Low back pain, left knee pain    Patient ID: Gerri Dow is a 41 y.o. female seen for consultation today at the request of  No ref. provider found    History of Present Illness    This is a 41-year-old woman known to our service for undergoing a left L5-S1 microdiscectomy in September 2020.  She is doing okay since that time with persistent tingling sensation in her left leg.  She does not have any radicular pain into her leg.  She is complaining of left knee pain and sometimes when walking her left knee will buckle and give way.  She is having a hard time describing her current symptoms but it does not seem that she has any radiculopathy.  She denies saddle anesthesia, bowel or bladder dysfunction.  She is not able to tolerate gabapentin.    The following portions of the patient's history were reviewed and updated as appropriate: allergies, current medications, past family history, past medical history, past social history, past surgical history and problem list.    Family history:   Family History   Problem Relation Age of Onset   • Heart disease Mother    • Hypertension Mother    • Diabetes Mother    • Hyperlipidemia Mother    • Hyperlipidemia Father    • Hypertension Father    • Depression Father    • Other Sister         Nonhodgkins lymphoma   • No Known Problems Brother         Smoker   • Breast cancer Maternal Aunt 50       Social history:   Social History     Socioeconomic History   • Marital status: Significant Other   Tobacco Use   • Smoking status: Never Smoker   • Smokeless tobacco: Never Used   Substance and Sexual Activity   • Alcohol use: Yes     Comment: 2 or less drinks a week   • Drug use: Yes     Types: Marijuana     Comment: Every couple days   • Sexual activity: Never       Review of Systems   Musculoskeletal: Positive for arthralgias and back pain.   Neurological: Negative for weakness and numbness.   All other systems reviewed and are negative.      Objective    Last menstrual period 03/17/2022.  There is no height or weight on file to calculate BMI.  Patient's There is no height or weight on file to calculate BMI. indicating that she is overweight (BMI 25-29.9). Patient's (There is no height or weight on file to calculate BMI.) indicates that they are overweight with health conditions that include dyslipidemias and osteoarthritis . Weight is unchanged. BMI is is above average; BMI management plan is completed. We discussed portion control and increasing exercise. .      Physical Exam      Assessment/Plan       This is a 41-year-old woman with a history of a left L5-S1 microdiscectomy in 2020.  She presents today with low back pain, tingling in her left leg and left knee pain.  She reports her left knee giving way and the pain and tenderness at the kneecap.  Have ordered an x-ray of her right knee.  I have referred her for physical therapy.  If symptoms persist despite PT, she will need to be evaluated in person in the office to see if a new lumbar MRI is warranted.  She may need a referral to orthopedics for her knee.  We will follow-up with her after physical therapy.  She instructed to call with new or worsening symptoms.    Diagnoses and all orders for this visit:    1. Chronic bilateral low back pain with left-sided sciatica (Primary)  -     Ambulatory Referral to Physical Therapy    2. Chronic pain of left knee  -     Ambulatory Referral to Physical Therapy  -     XR knee 3 vw left; Future        Return if symptoms worsen or fail to improve.             Julianna Alcantara PA-C     You have chosen to receive care through a telephone visit. Do you consent to use a telephone visit for your medical care today? Yes  I spent 20 minutes on the phone with the patient

## 2022-04-22 ENCOUNTER — TELEPHONE (OUTPATIENT)
Dept: NEUROSURGERY | Facility: CLINIC | Age: 42
End: 2022-04-22

## 2022-04-22 DIAGNOSIS — M51.16 LUMBAR DISC HERNIATION WITH RADICULOPATHY: ICD-10-CM

## 2022-04-22 DIAGNOSIS — F41.9 ANXIETY: Primary | ICD-10-CM

## 2022-04-22 NOTE — TELEPHONE ENCOUNTER
Caller: Victor M Gerri    Relationship: Self    Best call back number: [phone]    Caller requesting test results: XR KNEE 3 VW LEFT    What test was performed: XRAY    When was the test performed: 4/15/2022    Where was the test performed: [facility]    Additional notes: PT CALLED REQUESTING XRAY RESULTS.     PLEASE CALL PT AND ADVISE     THANK YOU

## 2022-04-22 NOTE — TELEPHONE ENCOUNTER
XR Knee 3 View Left (04/15/2022 13:32)    Called and s/w pt.  Discussed normal XR results.  Pt verbalized understanding.      Pt states she continues to have LLE knee weakness and neuropathy since previous surgery.  She begins PT on the 28th.  She is attempting to ride her bike today and was informed to take it easy, but to call us should anything change.  Pt states she would like to move forward with an appt for eval with MRI to determine what is causing LLE pain and weakness.  Pt also expressed an interest in seeing someone for psych.  She states she feels there may be a psychological component to her pain tolerance.  We talked about a possible referral to [facility] - pt was excited for this and would like to see [facility] as well.  Pt expressed a very optimistic attitude and was grateful for the call.      Order for MRI pended and Referral for [facility] pended if approved.

## 2022-04-22 NOTE — TELEPHONE ENCOUNTER
Provider: Coleman  Caller: Patient   Time of call:  1:38   Phone #: [phone]   Surgery: LUMBAR DISCECTOMY MICRO LEFT L5-S1   Surgery Date: 09/15/2020   Last visit: Office Visit with Julianna Alcantara PA-C (04/15/2022)    Next visit: NA    Reason for call:         Patient is inquiring about her XR results. Linked below:    XR Knee 3 View Left (04/15/2022 13:32)    No follow up on file. Patient was to only follow up if PT did not help.

## 2022-04-26 ENCOUNTER — OFFICE VISIT (OUTPATIENT)
Dept: PSYCHIATRY | Facility: CLINIC | Age: 42
End: 2022-04-26

## 2022-04-26 DIAGNOSIS — F43.9 TRAUMA AND STRESSOR-RELATED DISORDER: ICD-10-CM

## 2022-04-26 DIAGNOSIS — F41.8 MIXED ANXIETY DEPRESSIVE DISORDER: Primary | ICD-10-CM

## 2022-04-26 PROCEDURE — 90791 PSYCH DIAGNOSTIC EVALUATION: CPT | Performed by: STUDENT IN AN ORGANIZED HEALTH CARE EDUCATION/TRAINING PROGRAM

## 2022-05-10 ENCOUNTER — OFFICE VISIT (OUTPATIENT)
Dept: OBSTETRICS AND GYNECOLOGY | Facility: CLINIC | Age: 42
End: 2022-05-10

## 2022-05-10 VITALS
BODY MASS INDEX: 28 KG/M2 | DIASTOLIC BLOOD PRESSURE: 70 MMHG | HEIGHT: 64 IN | SYSTOLIC BLOOD PRESSURE: 128 MMHG | WEIGHT: 164 LBS

## 2022-05-10 DIAGNOSIS — N92.0 MENORRHAGIA WITH REGULAR CYCLE: Primary | ICD-10-CM

## 2022-05-10 PROCEDURE — 99213 OFFICE O/P EST LOW 20 MIN: CPT | Performed by: NURSE PRACTITIONER

## 2022-05-10 RX ORDER — AZELASTINE 1 MG/ML
SPRAY, METERED NASAL
COMMUNITY
Start: 2022-03-30

## 2022-05-10 RX ORDER — EPINEPHRINE 0.3 MG/.3ML
INJECTION SUBCUTANEOUS
COMMUNITY

## 2022-05-10 RX ORDER — EPINEPHRINE 0.3 MG/.3ML
INJECTION, SOLUTION INTRAMUSCULAR
COMMUNITY
Start: 2022-04-12

## 2022-05-10 NOTE — PROGRESS NOTES
Chief Complaint   Patient presents with   • Menstrual Problem     Heavy periods s/p ablation       Subjective   HPI  Gerri Dow is a 41 y.o. female, , who presents with heavy periods. She had an ablation in 10/2021.     Pt would like to discuss if there is any treatment for her heavy periods after having her ablation last . She states she is changing a tampon every 2 hours and it has become unmanageable. And her cycles are lasting up to 8 days at times. She might be interested in depo. She does not want a hysterectomy.     Her last LMP was Patient's last menstrual period was 2022 (exact date)..  Periods are regular, lasting 8 days.  Dysmenorrhea:none. .  Partner Status: Marital Status: same sex partner.  New Partners since last visit: no.  Desires STD Screening: no.    Additional OB/GYN History   Current contraception: contraceptive methods: None  Desires to: do not start contraception  Last Pap :   Last Completed Pap Smear          Ordered - PAP SMEAR (Every 3 Years) Ordered on 2021  SCANNED - PAP SMEAR              History of abnormal Pap smear: no  Last mammogram:   Last Completed Mammogram          MAMMOGRAM (Yearly) Next due on 2022  Mammo Diagnostic Digital Tomosynthesis Left With CAD    2021  Mammo Screening Digital Tomosynthesis Bilateral With CAD    2020  Mammo Screening Digital Tomosynthesis Bilateral With CAD              Tobacco Usage?: No   OB History        0    Para   0    Term   0       0    AB   0    Living   0       SAB   0    IAB   0    Ectopic   0    Molar   0    Multiple   0    Live Births   0                Health Maintenance   Topic Date Due   • ANNUAL PHYSICAL  Never done   • Pneumococcal Vaccine 0-64 (1 - PCV) Never done   • HEPATITIS C SCREENING  Never done   • TDAP/TD VACCINES (2 - Td or Tdap) 2021   • Annual Gynecologic Pelvic and Breast Exam  2022   • INFLUENZA VACCINE  2022   •  "MAMMOGRAM  01/28/2023   • PAP SMEAR  05/13/2024   • COVID-19 Vaccine  Completed       The additional following portions of the patient's history were reviewed and updated as appropriate: allergies, current medications, past family history, past medical history, past social history and past surgical history.    Review of Systems   Constitutional: Negative.    HENT: Negative.    Eyes: Negative.    Respiratory: Negative.    Cardiovascular: Negative.    Gastrointestinal: Negative.    Endocrine: Negative.    Genitourinary:        Menorrhagia    Musculoskeletal: Negative.    Allergic/Immunologic: Negative.    Neurological: Negative.    Hematological: Negative.    Psychiatric/Behavioral: Negative.        I have reviewed and agree with the HPI, ROS, and historical information as entered above. Aylin Verma, APRN    Objective   /70 (BP Location: Right arm, Patient Position: Sitting, Cuff Size: Adult)   Ht 162.6 cm (64.02\")   Wt 74.4 kg (164 lb)   LMP 04/25/2022 (Exact Date)   Breastfeeding No   BMI 28.14 kg/m²     Physical Exam  Vitals and nursing note reviewed.   Constitutional:       Appearance: Normal appearance.   HENT:      Head: Normocephalic and atraumatic.   Pulmonary:      Effort: Pulmonary effort is normal.   Neurological:      Mental Status: She is alert and oriented to person, place, and time.   Psychiatric:         Behavior: Behavior normal.             Assessment     Problem List Items Addressed This Visit    None     Visit Diagnoses     Menorrhagia with regular cycle    -  Primary            Plan     1. Menorrhagia s/p ablation- she had a stable U/S 2 months ago with LOS, and they discussed hysterectomy. Pt is not interested in that at this time. She would like to try hormonal birth control. We discussed different options, and she would like to proceed with OCP's. We reviewed how to take, side effects and risks. We also discussed that they may not improve symptoms. Pt would like to try. Denies h/o " HTN, blood clots, smoking. Follow up in 3 months with Moody.       KISHOR Caban  05/10/2022

## 2022-05-17 ENCOUNTER — OFFICE VISIT (OUTPATIENT)
Dept: PSYCHIATRY | Facility: CLINIC | Age: 42
End: 2022-05-17

## 2022-05-17 DIAGNOSIS — F43.23 ADJUSTMENT DISORDER WITH MIXED ANXIETY AND DEPRESSED MOOD: Primary | ICD-10-CM

## 2022-05-17 PROCEDURE — 90837 PSYTX W PT 60 MINUTES: CPT | Performed by: STUDENT IN AN ORGANIZED HEALTH CARE EDUCATION/TRAINING PROGRAM
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
## 2022-06-16 NOTE — PROGRESS NOTES
"     Office  Follow Up Visit      Patient Name: Gerri Dow  : 1980   MRN: [id number]   Start: 2:59  Stop: 4:00  Referring Provider: Shannon Moody MD    Chief Complaint:      ICD-10-CM ICD-9-CM   1. Adjustment disorder with mixed anxiety and depressed mood  F43.23 309.28        History of Present Illness:   Gerri Dow is a 41 y.o. female who is being seen for a follow-up care navigation appointment.  Her surgical history includes a L5-S1 microdiscectomy in  by Dr. Cee. She notes concerns related to continued pain post-surgery, hormonal concerns, neuropathy, and left knee pain. Her current psychosocial stressors have included the Coronavirus pandemic and witnessing her partner's struggle with breast cancer since her diagnosis in 2020.  She reports a history of depression, anxiety, and PTSD. She states she currently receives psychiatric treatment with a counselor through .     Subjective      Review of Systems:   Review of Systems    PHQ-9 Depression Screening Score:  0    Patient History:   The following portions of the patient's history were reviewed and updated as appropriate: allergies, current medications, past family history, past medical history, past social history, past surgical history and problem list.     Social:   Ms. Dow states that since her last appointment that her mood has drastically improved. She reports that \"since we last spoke,  I have gotten back to riding my bike, got on birth control, and spent a weekend with my partner\".     Medications:     Current Outpatient Medications:   •  acetaminophen (TYLENOL) 500 MG tablet, Take 1,000 mg by mouth Every 6 (Six) Hours As Needed for Mild Pain ., Disp: , Rfl:   •  albuterol sulfate  (90 Base) MCG/ACT inhaler, Inhale 2 puffs Every 6 (Six) Hours As Needed for Wheezing or Shortness of Air., Disp: 6.7 g, Rfl: 0  •  Auvi-Q 0.3 MG/0.3ML solution auto-injector injection, INJECT AS NEEDED FOR SEVERE ALLERGIC REACTION " INCLUDING ANAPHYLAXIS AS DIRECTED. TAKE 1 INJECTOR AS NEEDED FOR ANAPHYLAXIS. KEEP ON SELF AT, Disp: , Rfl:   •  azelastine (ASTELIN) 0.1 % nasal spray, USE 2 SPRAYS NASALLY TWICE DAILY AS NEEDED, Disp: , Rfl:   •  EPINEPHrine (EPIPEN) 0.3 MG/0.3ML solution auto-injector injection, Auvi-Q 0.3 mg/0.3 mL injection, auto-injector  INJECT AS NEEDED FOR SEVERE ALLERGIC REACTION INCLUDING ANAPHYLAXIS AS DIRECTED. TAKE 1 INJECTOR AS NEEDED FOR ANAPHYLAXIS. KEEP ON SELF AT, Disp: , Rfl:   •  methylPREDNISolone (MEDROL) 4 MG dose pack, Take as directed on package instructions., Disp: 21 tablet, Rfl: 0    Objective     Physical Exam:  Vital Signs: There were no vitals filed for this visit.    Mental Status Exam:   Appearance: Neat  Hygiene: Clean  Cooperation: Cooperative  Eye Contact: Normal  Psychomotor Behavior: Unremarkable   Mood: Euthymic  Affect: Appropriate  Speech: Intelligible  Thought Process: Normal  Thought Content: Congruent to mood  Suicidal: No SI  Homicidal: No HI  Hallucinations: None  Delusion: None  Memory: Intact  Orientation: Orientated x4  Reliability: Good  Insight: Fair  Judgement: Good  Impulse Control: Influenced by pain     Assessment / Plan      Visit Diagnosis/Orders Placed This Visit:  Diagnoses and all orders for this visit:    1. Adjustment disorder with mixed anxiety and depressed mood (Primary)      Differential:   Generalized Anxiety Disorder versus Major Depressive Disorder, With anxious distress    Praised the patient for her progress with treatment goals, including improved mood and activity. Collaboratively discussed the impact that pain catastrophizing, avoidance, anxiety, and depression can have on the chronic pain cycle. Analyzed how activity pacing, hormone medication via birth control, and increased communication with her partner was helpful to mood and improved her overall pain tolerance.     Plan:  The patient agreed to continue individual psychotherapy at  and to work on  conflict resolution and thought challenging skills. She will continue supportive psychotherapy efforts with this provider as needed. Treatment and medications were discussed during today's visit.     Patient acknowledged and verbally consented to continue with current treatment plan and was educated on the importance of compliance with treatment and follow-up appointments. Patient seems reasonably able to adhere to treatment plan.      Medication Considerations:  Patient is agreeable to call the office with any worsening of symptoms or onset of side effects. Patient is agreeable to call 911 or go to the nearest ER should she begin having SI/HI.    Quality Measures:   Never a tobacco smoker.     Gerri Dow appears aware of the risks of tobacco use.     Follow Up:   Return in about 1 month (around 6/17/2022).      Re Gregory, PhD Temp Licensed Psychologist

## 2022-08-29 NOTE — PROGRESS NOTES
Chief Complaint   Patient presents with   • Follow-up            HPI  Gerri Dow is a 40 y.o. female, , who presents with recurrent evaluation of Menorrhagia      Her last LMP was Patient's last menstrual period was 2021..  Periods are regular every 25-35 days, lasting 6 days. The patient uses 1 of tampons/pads per hour..    She states she has experienced this problem for several months.  She describes the severity as moderate.  She states that the problem is stable.  The patient reports additional symptoms as none.  The patient has been evaluated: No.    Patient states cycles have become heavier and more clots. Patient states she would like to talk about an ablation.     Did the patient have u/s today? Yes.  Findings showed 4 cm fibroid.  I have personally evaluated the U/S and agree with the findings. Shannon Moody MD    Additional OB/GYN History   Last Pap :   Last Completed Pap Smear          Ordered - PAP SMEAR (Every 3 Years) Ordered on 2021  SCANNED - PAP SMEAR              History of abnormal Pap smear: no  Tobacco Usage?: No     The additional following portions of the patient's history were reviewed and updated as appropriate: allergies, current medications, past family history, past medical history, past social history, past surgical history and problem list.    Review of Systems   Constitutional: Negative.    HENT: Negative.    Eyes: Negative.    Respiratory: Negative.    Cardiovascular: Negative.    Gastrointestinal: Negative.    Endocrine: Negative.    Genitourinary: Negative.    Musculoskeletal: Negative.    Skin: Negative.    Allergic/Immunologic: Negative.    Neurological: Negative.    Hematological: Negative.    Psychiatric/Behavioral: Negative.      All other systems reviewed and are negative.     Past Surgical History:   Procedure Laterality Date   • CYST REMOVAL      right shoulder   • LUMBAR DISCECTOMY Left 9/15/2020    Procedure: LUMBAR DISCECTOMY MICRO  "LEFT L5-S1;  Surgeon: Billy Cee MD;  Location: [facility];  Service: Neurosurgery;  Laterality: Left;   • TONSILLECTOMY         I have reviewed and agree with the HPI, ROS, and historical information as entered above. Shannon Moody MD    Objective   /72   Ht 162.6 cm (64\")   Wt 76.2 kg (168 lb)   LMP 09/09/2021   BMI 28.84 kg/m²     Physical Exam  Constitutional:       Appearance: She is well-developed.   HENT:      Head: Normocephalic.   Eyes:      Conjunctiva/sclera: Conjunctivae normal.   Pulmonary:      Effort: Pulmonary effort is normal.   Psychiatric:         Behavior: Behavior normal.            Assessment and Plan    Problem List Items Addressed This Visit     None      Visit Diagnoses     Menorrhagia with regular cycle    -  Primary    Intramural leiomyoma of uterus              1. HMB- she has a 4 cm fibroid that is likely not contributing significantly and she is still a good candidate for Novasure.  Reviewed fibroids and will need 6 month f/u of this.  2. Plan Novasure ablation.  She could not tolerate endometrial biopsy    Shannon Moody MD  09/16/2021    " n/a

## 2022-09-14 NOTE — ANESTHESIA POSTPROCEDURE EVALUATION
Patient: Gerri Dow    Procedure Summary     Date: 09/15/20 [facility] / [facility]    Anesthesia Start: 1248 Anesthesia Stop: 1403    Procedure: LUMBAR DISCECTOMY MICRO LEFT L5-S1 (Left Spine Lumbar) Diagnosis:       Lumbar disc herniation with radiculopathy      (Lumbar disc herniation with radiculopathy [M51.16])    Surgeon: Billy Cee MD Provider: Dale Baker MD    Anesthesia Type: general ASA Status: 2          Anesthesia Type: general    Vitals  Vitals Value Taken Time   BP     Temp     Pulse 92 09/15/20 1403   Resp     SpO2 100 % 09/15/20 1403   Vitals shown include unvalidated device data.        Post Anesthesia Care and Evaluation    Patient location during evaluation: PACU  Patient participation: complete - patient participated  Level of consciousness: awake and alert  Pain score: 0  Pain management: adequate  Airway patency: patent  Anesthetic complications: No anesthetic complications  PONV Status: none  Cardiovascular status: hemodynamically stable and acceptable  Respiratory status: nonlabored ventilation, acceptable and nasal cannula  Hydration status: acceptable       Infectious Disease

## 2022-10-20 ENCOUNTER — TRANSCRIBE ORDERS (OUTPATIENT)
Dept: ADMINISTRATIVE | Facility: HOSPITAL | Age: 42
End: 2022-10-20

## 2022-10-20 DIAGNOSIS — Z12.31 VISIT FOR SCREENING MAMMOGRAM: Primary | ICD-10-CM

## 2022-10-26 ENCOUNTER — TELEPHONE (OUTPATIENT)
Dept: OBSTETRICS AND GYNECOLOGY | Facility: CLINIC | Age: 42
End: 2022-10-26

## 2022-10-26 NOTE — TELEPHONE ENCOUNTER
She has decided she wants to have a hysterectomy, possibly the DaVinci and leave her ovaries, She is taking the OCP's, S/P ablation 10/21. Apt 11/4/2022 with Dr. Moody. Her partner is battling breast cancer and will need a hysterectomy soon.

## 2022-10-26 NOTE — TELEPHONE ENCOUNTER
PT STATES SHE HAS BEEN BLEEDING FROM HER CYCLE FOR THE LAST TWO WEEKS. SHE STATES SHE IS GOING THROUGH 6 OR 7 PADS A DAY. SHE WOULD LIKE SO SPEAK TO SOMEONE ABOUT THIS AND SEE IF SHE CAN GET IN TO SEE DR ARREDONDO SOON.     PLEASE ADVISE

## 2022-11-04 ENCOUNTER — OFFICE VISIT (OUTPATIENT)
Dept: OBSTETRICS AND GYNECOLOGY | Facility: CLINIC | Age: 42
End: 2022-11-04

## 2022-11-04 VITALS
DIASTOLIC BLOOD PRESSURE: 64 MMHG | HEIGHT: 64 IN | SYSTOLIC BLOOD PRESSURE: 128 MMHG | WEIGHT: 163.2 LBS | BODY MASS INDEX: 27.86 KG/M2

## 2022-11-04 DIAGNOSIS — N92.0 MENORRHAGIA WITH REGULAR CYCLE: Primary | ICD-10-CM

## 2022-11-04 DIAGNOSIS — D25.1 INTRAMURAL LEIOMYOMA OF UTERUS: ICD-10-CM

## 2022-11-04 PROCEDURE — 99214 OFFICE O/P EST MOD 30 MIN: CPT | Performed by: OBSTETRICS & GYNECOLOGY

## 2022-11-04 RX ORDER — NORGESTIMATE AND ETHINYL ESTRADIOL 0.25-0.035
1 KIT ORAL DAILY
COMMUNITY
Start: 2022-09-20

## 2022-11-04 NOTE — PROGRESS NOTES
Chief Complaint   Patient presents with   • Menorrhagia         Subjective   HPI  Gerri Dow is a 42 y.o. female, , her last LMP was Patient's last menstrual period was 10/13/2022. She presents for a follow up evaluation of Abnormal Uterine Bleeding and Menorrhagia. The patient was last seen on 05/10/2022 by KISHOR Caban. At that time she reported menorrhagia s/p endometrial ablation in 10/2021. She had none The plan was OCPs. Since the last visit the patient reports the plan has remained unchanged. This has been an issue in the past. The patient reports additional symptoms as none.      Patient states that she has a menstrual period every 10 days, lasting 2-3 weeks, flow is light for 2 weeks followed by heavy flow for 1 week with vaginal blood clots, dysmenorrhea: none. Patient reports saturating a pad/tampon every 2 hours on her heaviest day of flow. Patient reports lightheadedness, dizziness, headaches, and mood swings with menstruals.     Patient would like to discuss a hysterectomy. Patient states that her partner is recovering from breast cancer and will undergo a hysterectomy at the beginning of 2022.    US was not done today.       Additional OB/GYN History   Last Pap :   Last Completed Pap Smear          PAP SMEAR (Every 3 Years) Next due on 2021  SCANNED - PAP SMEAR              History of abnormal Pap smear: no  Tobacco Usage?: No       Current Outpatient Medications:   •  acetaminophen (TYLENOL) 500 MG tablet, Take 1,000 mg by mouth Every 6 (Six) Hours As Needed for Mild Pain ., Disp: , Rfl:   •  albuterol sulfate  (90 Base) MCG/ACT inhaler, Inhale 2 puffs Every 6 (Six) Hours As Needed for Wheezing or Shortness of Air., Disp: 6.7 g, Rfl: 0  •  Auvi-Q 0.3 MG/0.3ML solution auto-injector injection, INJECT AS NEEDED FOR SEVERE ALLERGIC REACTION INCLUDING ANAPHYLAXIS AS DIRECTED. TAKE 1 INJECTOR AS NEEDED FOR ANAPHYLAXIS. KEEP ON SELF AT, Disp: ,  "Rfl:   •  azelastine (ASTELIN) 0.1 % nasal spray, USE 2 SPRAYS NASALLY TWICE DAILY AS NEEDED, Disp: , Rfl:   •  EPINEPHrine (EPIPEN) 0.3 MG/0.3ML solution auto-injector injection, Auvi-Q 0.3 mg/0.3 mL injection, auto-injector  INJECT AS NEEDED FOR SEVERE ALLERGIC REACTION INCLUDING ANAPHYLAXIS AS DIRECTED. TAKE 1 INJECTOR AS NEEDED FOR ANAPHYLAXIS. KEEP ON SELF AT, Disp: , Rfl:   •  Ana 0.25-35 MG-MCG per tablet, Take 1 tablet by mouth Daily., Disp: , Rfl:      Past Medical History:   Diagnosis Date   • Anxiety    • Asthma         Past Surgical History:   Procedure Laterality Date   • CYST REMOVAL      right shoulder   • ENDOMETRIAL ABLATION W/ NOVASURE  10/2021   • LUMBAR DISCECTOMY Left 09/15/2020    Procedure: LUMBAR DISCECTOMY MICRO LEFT L5-S1;  Surgeon: Billy Cee MD;  Location: [facility];  Service: Neurosurgery;  Laterality: Left;   • TONSILLECTOMY         The additional following portions of the patient's history were reviewed and updated as appropriate: allergies and current medications.    Review of Systems   Constitutional: Negative.    HENT: Negative.    Eyes: Negative.    Respiratory: Negative.    Cardiovascular: Negative.    Gastrointestinal: Negative.    Endocrine: Negative.    Genitourinary: Positive for menstrual problem.   Musculoskeletal: Negative.    Skin: Negative.    Allergic/Immunologic: Negative.    Neurological: Negative.    Hematological: Negative.    Psychiatric/Behavioral: Negative.        I have reviewed and agree with the HPI, ROS, and historical information as entered above. Shannon Moody MD    Objective   /64   Ht 162.6 cm (64\")   Wt 74 kg (163 lb 3.2 oz)   LMP 10/13/2022   BMI 28.01 kg/m²     Physical Exam  Constitutional:       Appearance: She is well-developed.   HENT:      Head: Normocephalic.   Eyes:      Conjunctiva/sclera: Conjunctivae normal.   Pulmonary:      Effort: Pulmonary effort is normal.   Psychiatric:         Behavior: Behavior normal. "         Assessment & Plan     Assessment     Problem List Items Addressed This Visit    None  Visit Diagnoses     Menorrhagia with regular cycle    -  Primary    Intramural leiomyoma of uterus                Plan     1. HMB s/p endometrial ablation.  Known uterine leiomyoma.  Discussed options but ultimately hysterectomy is warranted.  Discussed ovarian conservation vs removal, risks/benefits and what to expect with recovery after a TLH or LULU.  She had questions regarding DaVinci vs TLH. All questions were answered to best of my ability.      Total time spent today with Gerri  was 30-39 minutes (level 4).  Off this time, 90% was spent face-to-face time coordinating care, answering her questions and counseling regarding pathophysiology of her presenting problem along with plans for any diagnositc work-up and treatment.      Shannon Moody MD  11/04/2022

## 2022-11-30 ENCOUNTER — HOSPITAL ENCOUNTER (OUTPATIENT)
Dept: MAMMOGRAPHY | Facility: HOSPITAL | Age: 42
Discharge: HOME | End: 2022-11-30
Admitting: OBSTETRICS & GYNECOLOGY

## 2022-11-30 DIAGNOSIS — Z12.31 VISIT FOR SCREENING MAMMOGRAM: ICD-10-CM

## 2022-11-30 PROCEDURE — 77067 SCR MAMMO BI INCL CAD: CPT

## 2022-11-30 PROCEDURE — 77067 SCR MAMMO BI INCL CAD: CPT | Performed by: RADIOLOGY

## 2022-11-30 PROCEDURE — 77063 BREAST TOMOSYNTHESIS BI: CPT

## 2022-11-30 PROCEDURE — 77063 BREAST TOMOSYNTHESIS BI: CPT | Performed by: RADIOLOGY

## 2023-01-18 ENCOUNTER — TELEPHONE (OUTPATIENT)
Dept: OBSTETRICS AND GYNECOLOGY | Facility: CLINIC | Age: 43
End: 2023-01-18
Payer: COMMERCIAL

## 2023-01-18 NOTE — TELEPHONE ENCOUNTER
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

## 2023-01-19 NOTE — TELEPHONE ENCOUNTER
Per Dr. Hamilton, she advised that we get her in earlier, specifically 2/2 at 9AM - rescheduled patient accordingly.  Called and spoke with patient, informed her of new appt time and confirmed availability.  She was very appreciative and will let us know if she has any questions/concerns prior.

## 2023-01-19 NOTE — TELEPHONE ENCOUNTER
"\"I am more than happy to see her. Has she ever tried a Mirena IUD?   Deirdre Hamilton MD\"    Called and spoke with patient, she stated that she has not tried a Mirena IUD at all.  Patient states she is having continued abnormal heavy bleeding, she does state that at times intermittently she can have a 30 minute period where she will fully saturate a regular sized pad.  She has seen Moody and is currently scheduled for hysterectomy but is really hoping that Dr. Hamilton may offer her another option that may work. She states that Dr. Hamilton was highly recommended by Dr. Terry, who did her wife's surgery.  She has been scheduled for the first New Gyn appt on 4/18/2023, however she would really appreciate if she could be seen earlier if at all possible and okayed by Dr. Hamilton.  While she is currently scheduled for that hysterectomy, she is hoping there would be another option to try as she has great concerns about not being able to properly care for her wife (she is her caretaker), etc, as she needs to for such a long time post-op.    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  "

## 2023-01-26 ENCOUNTER — TELEPHONE (OUTPATIENT)
Dept: OBSTETRICS AND GYNECOLOGY | Facility: CLINIC | Age: 43
End: 2023-01-26
Payer: COMMERCIAL

## 2023-01-26 NOTE — TELEPHONE ENCOUNTER
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    Who are you requesting to speak with (clinical staff, provider,  specific staff member): CHOLO    Do you know the name of the person who called: CHOLO    What was the call regarding: SURG IN APRIL    Do you require a callback: YES      PT IS GETTING A SECOND OPINION, SHE THINKS SHE DOES NOT WANT TO PROCEED W HYSTERECTOMY.

## 2023-01-27 NOTE — TELEPHONE ENCOUNTER
Called patient and she would like to cancel sx. She is getting a second opinion and uncomfortable moving forward. Let pt know I would cancel and please let us know if would like to R/S or schedule follow up to see LOS to discuss more. Pt v/u.

## 2023-02-02 ENCOUNTER — OFFICE VISIT (OUTPATIENT)
Dept: OBSTETRICS AND GYNECOLOGY | Facility: CLINIC | Age: 43
End: 2023-02-02
Payer: COMMERCIAL

## 2023-02-02 VITALS
BODY MASS INDEX: 27.66 KG/M2 | WEIGHT: 162 LBS | DIASTOLIC BLOOD PRESSURE: 82 MMHG | RESPIRATION RATE: 14 BRPM | HEIGHT: 64 IN | SYSTOLIC BLOOD PRESSURE: 120 MMHG

## 2023-02-02 DIAGNOSIS — N92.0 MENORRHAGIA WITH REGULAR CYCLE: ICD-10-CM

## 2023-02-02 DIAGNOSIS — D25.9 UTERINE LEIOMYOMA, UNSPECIFIED LOCATION: ICD-10-CM

## 2023-02-02 DIAGNOSIS — N93.9 ABNORMAL UTERINE BLEEDING (AUB): Primary | ICD-10-CM

## 2023-02-02 PROCEDURE — 99214 OFFICE O/P EST MOD 30 MIN: CPT | Performed by: STUDENT IN AN ORGANIZED HEALTH CARE EDUCATION/TRAINING PROGRAM

## 2023-02-02 NOTE — PROGRESS NOTES
"Subjective   Chief Complaint   Patient presents with   • Vaginal Bleeding     Heavy vaginal bleeding with large clots due to uterine fibroids.      Gerri Dow is a 42 y.o. year old .  No LMP recorded (lmp unknown).  She presents for a second opinion for heavy vaginal bleeding due to uterine fibroids.  She has a history of an ablation in , and has failed a trial of OCPs around summer of last year. She has a known uterine fibroid measuring about 4cm. She was initially scheduled for hysterectomy in April, but due to health concerns with her partner, she would like to avoid major surgery due to caregiving needs.  Today she reports continued extremely heavy and painful periods, and would like to discuss all her possible options, including UAE.     The following portions of the patient's history were reviewed and updated as appropriate:current medications, allergies, past medical history and past surgical history    Social History    Tobacco Use      Smoking status: Never      Smokeless tobacco: Never         Objective   /82 (BP Location: Right arm, Patient Position: Sitting, Cuff Size: Adult)   Resp 14   Ht 162.6 cm (64\")   Wt 73.5 kg (162 lb)   LMP  (LMP Unknown)   Breastfeeding No   BMI 27.81 kg/m²     General:  well developed; well nourished  no acute distress   Lungs:  breathing is unlabored   Heart:  Not performed.   Pelvis: deferred     Lab Review   Tissue pathology from [facility] and ablation      Imaging   Pelvic ultrasound report        Assessment   1. AUB   2. Uterine leiomyoma   3. HMB      Plan   1. Extensively discussed multiple different options other than hysterectomy, including IUD versus different OCP versus Myfembree.  Patient also interested in discussing uterine artery embolization, which this provider does not perform.  However discussed this provider would be more than happy to place a referral to Interventional Radiology for consultation for UAE.  Discussed all options " are no guarantee for complete amenorrhea or resolution of her symptoms, but can improve her symptoms potentially over a period of time.  Discussed she may be on Myfembree for up to 2 to 3 years versus a Mirena IUD which can be in place for up to 8 years versus UAE which may improve her symptoms up until menopause.  Discussed risks and benefits for all medications and procedures in depth.  Ultimately discussed that patient needs to keep her fourth opinion visit with Dr. Boothe next week.  After discussing options with him, encouraged patient to either follow up with that provider or call should she desire a referral to Interventional Radiology for consultation. Handouts given on Myfembree and Mirena today.   2. All questions answered to the best of my ability.   3. The importance of keeping all planned follow-up and taking all medications as prescribed was emphasized.  4. Follow up pending patient decision.     No orders of the defined types were placed in this encounter.         This note was electronically signed.    Deirdre Hamilton MD   February 6, 2023

## 2023-02-10 ENCOUNTER — TELEPHONE (OUTPATIENT)
Dept: OBSTETRICS AND GYNECOLOGY | Facility: CLINIC | Age: 43
End: 2023-02-10
Payer: COMMERCIAL

## 2023-02-10 NOTE — TELEPHONE ENCOUNTER
Spoke with patient. She states she was calling to confirm surgery for April. Patient states she does not want a total hysterectomy she wants to keep her ovaries. Patient would need surgery scheduled, PAT appointment and pre op appointment in office.

## 2023-02-10 NOTE — TELEPHONE ENCOUNTER
Returned pt phone call. Pt interested in r/s surgery after receiving second opinion. Let pt know April date no longer available, looking at August as first available for Moody. Pt would like to take spot for now, but mentioned calling Dr. Hamilton and other doctor's she saw for other opinion's offices about sx (potentially sooner dates available) so let patient know I would birdie August on my calendar, but not schedule quite yet and call in a couple months to set up with pre-op, PAT and post op if still interested. Pt V/U.

## 2023-02-10 NOTE — TELEPHONE ENCOUNTER
Caller: Gerri Dow    Relationship to patient: Self    Best call back number: [phone]    Patient is needing: PATIENT WOULD LIKE A RETURN CALL ABOUT SURGERY PROCEDURE SCHEDULED FOR April.

## 2023-03-27 ENCOUNTER — TRANSCRIBE ORDERS (OUTPATIENT)
Dept: ADMINISTRATIVE | Facility: HOSPITAL | Age: 43
End: 2023-03-27
Payer: COMMERCIAL

## 2023-03-27 DIAGNOSIS — Z12.31 VISIT FOR SCREENING MAMMOGRAM: Primary | ICD-10-CM

## 2023-12-14 ENCOUNTER — HOSPITAL ENCOUNTER (OUTPATIENT)
Dept: MAMMOGRAPHY | Facility: HOSPITAL | Age: 43
Discharge: HOME | End: 2023-12-14
Admitting: OBSTETRICS & GYNECOLOGY
Payer: COMMERCIAL

## 2023-12-14 DIAGNOSIS — Z12.31 VISIT FOR SCREENING MAMMOGRAM: ICD-10-CM

## 2023-12-14 PROCEDURE — 77063 BREAST TOMOSYNTHESIS BI: CPT

## 2023-12-14 PROCEDURE — 77067 SCR MAMMO BI INCL CAD: CPT

## 2024-02-20 ENCOUNTER — TELEPHONE (OUTPATIENT)
Dept: NEUROSURGERY | Facility: CLINIC | Age: 44
End: 2024-02-20
Payer: COMMERCIAL

## 2024-02-20 NOTE — TELEPHONE ENCOUNTER
PATIENT: CELINA EASTMAN    PROVIDER: DR. BANEGAS    CALL BACK # [phone]     REASON:    PATIENT STATES THAT HER LEFT SIDE WILL GO NUMB, AND HER FINGERS AND HANDS WILL LOCK UP AND SHE WILL NOT BE ABLE TO MOVE IT. SHE SAYS THAT THIS HAS BEEN GOING ON FOR AT LEAST A YEAR. SHE SAYS THAT THIS IS HAPPENING ALL THE WAY FROM HER FOOT TO HER SHOULDER AND ARM. NO BOWL OR BLADDER INCONTINENCE. SHE WANTS TO BE SEEN BY DR. BANEGAS ABOUT THIS, AS SHE BELIEVES IT IS RELATED TO SPINE/NERVOUS SYSTEM - HER PCP DOES NOT KNOW WHAT TO MAKE OF IS. SHE HAS BEEN SEEN IN OUR OFFICE IN THE LAST THREE YEARS, BUT NOT BY DR. BANEGAS, AND THE PROVIDERS SHE SAW ARE NO LONGER PRACTICING IN OUT OFFICE. SHE HAD SX WITH DR. BANEGAS IN 2020. PLEASE CALL THE PATIENT TO ADVISE IF WE CAN SCHEDULE OR IF WE NEED A NEW REFERRAL GIVEN HER SITUATION. THANKS.

## 2024-03-15 ENCOUNTER — TELEPHONE (OUTPATIENT)
Dept: NEUROSURGERY | Facility: CLINIC | Age: 44
End: 2024-03-15
Payer: COMMERCIAL

## 2024-03-15 NOTE — TELEPHONE ENCOUNTER
Caller: Gerri Dow    Relationship to patient: Self    Best call back number: [phone]    Chief complaint: NONE    Type of visit: F/U    Requested date: ANY     If rescheduling, when is the original appointment: 3-20-24 --HUB CANCELLED    Additional notes:PLEASE CALL TO RESCHEDULE    THANK YOU,

## 2024-04-05 ENCOUNTER — OFFICE VISIT (OUTPATIENT)
Dept: NEUROSURGERY | Facility: CLINIC | Age: 44
End: 2024-04-05
Payer: COMMERCIAL

## 2024-04-05 VITALS
SYSTOLIC BLOOD PRESSURE: 150 MMHG | HEIGHT: 64 IN | DIASTOLIC BLOOD PRESSURE: 90 MMHG | BODY MASS INDEX: 27.66 KG/M2 | WEIGHT: 162 LBS | TEMPERATURE: 98.4 F

## 2024-04-05 DIAGNOSIS — G89.18 POST-OPERATIVE PAIN: ICD-10-CM

## 2024-04-05 DIAGNOSIS — G89.28 OTHER CHRONIC POSTPROCEDURAL PAIN: ICD-10-CM

## 2024-04-05 DIAGNOSIS — R26.89 BALANCE PROBLEM: Primary | ICD-10-CM

## 2024-04-05 DIAGNOSIS — M79.605 LEFT LEG PAIN: ICD-10-CM

## 2024-04-05 DIAGNOSIS — M51.16 LUMBAR DISC HERNIATION WITH RADICULOPATHY: ICD-10-CM

## 2024-04-05 DIAGNOSIS — F41.9 ANXIETY: ICD-10-CM

## 2024-04-05 PROCEDURE — 99214 OFFICE O/P EST MOD 30 MIN: CPT

## 2024-04-05 RX ORDER — DIPHENHYDRAMINE HCL 25 MG
25 CAPSULE ORAL EVERY 6 HOURS PRN
COMMUNITY

## 2024-04-05 NOTE — PROGRESS NOTES
"Name: Gerri Dow    : 1980     MRN: [id number]     Primary Care Provider: Shannon Moody MD    Chief Complaint  Left leg pain/tingling/weakness, low back pain    History of Present Illness:  Gerri Dow is a 43 y.o. female who is well-known to our clinic and has a history of left-sided L5-S1 microdiscectomy in  by Dr. Cee. She has been seen at our office on several occasions for lingering left lower extremity pain/tingling/numbness and left knee weakness. She has multiple somatic complaints including left arm pain, and shooting pain lateralized along the underside of the left breast, and balance issues. She also endorses episodes of \"hand-locking\" and losing control of her hands that began last year. She states that she has been dealing with these pains on and off for the past several years with recent exacerbation since 2023. She states that she cannot sleep throughout the night. She states that the episodes of \"hand locking\" began in 2023 and she states that she can be cooking in the kitchen and just loses control of her hands. She also states that she has been dealing with these somatic episodes of pain since surgery in 2020. She describes her left leg pain as constant and tingling in nature and similar in appearance since prior to surgery. She is having difficulty describing the leg pain. She states that she did have a period of time in the post-op stage where her leg pain was relieved but then returned a few months later. She also endorses that it feels as if she is \"stepping on a golf ball\" when she is walking on her left foot. She has a chronic history of low back pain. She endorses that the pain under her left breast began after having a hysterectomy (fibroids) on May 11th, 2023. She has been seen at our office multiple times and has completed physical therapy, pain management, psychotherapy (Re Gregory, PhD). She is very tearful during this " encounter and states that she is wanting answers. She has had 3 surgeries (microdisc, hysterectomy, ablation) within the past 3 years and has witnessed her partner's struggle to bledsoe breast CA. She has a history of depression, PTSD, and anxiety. She denies any loss of bladder/bowel control or saddle anesthesia. She is unable to tolerate gabapentin due to side effects. She is not taking any medications and self-medicates with marijuana.       The following portions of the patient's history were reviewed and updated as appropriate and include current medications, allergies, allergies, past family history, past medical history, past social history, past surgical history and problem list.     PMHX  Allergies:  Allergies   Allergen Reactions    Gabapentin Other (See Comments)     hallucinations     Medications    Current Outpatient Medications:     acetaminophen (TYLENOL) 500 MG tablet, Take 2 tablets by mouth Every 6 (Six) Hours As Needed for Mild Pain., Disp: , Rfl:     albuterol sulfate  (90 Base) MCG/ACT inhaler, Inhale 2 puffs Every 6 (Six) Hours As Needed for Wheezing or Shortness of Air., Disp: 6.7 g, Rfl: 0    Auvi-Q 0.3 MG/0.3ML solution auto-injector injection, INJECT AS NEEDED FOR SEVERE ALLERGIC REACTION INCLUDING ANAPHYLAXIS AS DIRECTED. TAKE 1 INJECTOR AS NEEDED FOR ANAPHYLAXIS. KEEP ON SELF AT, Disp: , Rfl:     diphenhydrAMINE (BENADRYL) 25 mg capsule, Take 1 capsule by mouth Every 6 (Six) Hours As Needed., Disp: , Rfl:     EPINEPHrine (EPIPEN) 0.3 MG/0.3ML solution auto-injector injection, Auvi-Q 0.3 mg/0.3 mL injection, auto-injector  INJECT AS NEEDED FOR SEVERE ALLERGIC REACTION INCLUDING ANAPHYLAXIS AS DIRECTED. TAKE 1 INJECTOR AS NEEDED FOR ANAPHYLAXIS. KEEP ON SELF AT, Disp: , Rfl:   Past Medical History:  Past Medical History:   Diagnosis Date    Anxiety     Asthma     Uterine fibroid      Past Surgical History:  Past Surgical History:   Procedure Laterality Date    CYST REMOVAL      right  shoulder    ENDOMETRIAL ABLATION W/ NOVASURE  10/2021    HYSTERECTOMY  05/11/2023    LUMBAR DISCECTOMY Left 09/15/2020    Procedure: LUMBAR DISCECTOMY MICRO LEFT L5-S1;  Surgeon: Billy Cee MD;  Location: [facility];  Service: Neurosurgery;  Laterality: Left;    TONSILLECTOMY       Social Hx:  Social History     Tobacco Use    Smoking status: Never    Smokeless tobacco: Never   Substance Use Topics    Alcohol use: Yes     Comment: 2 or less drinks a week    Drug use: Yes     Types: Marijuana     Comment: Every couple days     Family Hx:  Family History   Problem Relation Age of Onset    Heart disease Mother     Hypertension Mother     Diabetes Mother     Hyperlipidemia Mother     Hyperlipidemia Father     Hypertension Father     Depression Father     Other Sister         Nonhodgkins lymphoma    No Known Problems Brother         Smoker    Breast cancer Maternal Aunt 50    Ovarian cancer Neg Hx      Review of Systems:        Review of Systems   Constitutional:  Negative for activity change, appetite change, chills, diaphoresis, fatigue, fever and unexpected weight change.   HENT:  Negative for congestion, dental problem, drooling, ear discharge, ear pain, facial swelling, hearing loss, mouth sores, nosebleeds, postnasal drip, rhinorrhea, sinus pressure, sinus pain, sneezing, sore throat, tinnitus, trouble swallowing and voice change.    Eyes:  Negative for photophobia, pain, discharge, redness, itching and visual disturbance.   Respiratory:  Negative for apnea, cough, choking, chest tightness, shortness of breath, wheezing and stridor.    Cardiovascular:  Negative for chest pain, palpitations and leg swelling.   Gastrointestinal:  Positive for abdominal pain. Negative for abdominal distention, anal bleeding, blood in stool, constipation, diarrhea, nausea, rectal pain and vomiting.   Endocrine: Negative for cold intolerance, heat intolerance, polydipsia, polyphagia and polyuria.   Genitourinary:  Positive  "for pelvic pain. Negative for decreased urine volume, difficulty urinating, dyspareunia, dysuria, enuresis, flank pain, frequency, genital sores, hematuria, menstrual problem, urgency, vaginal bleeding, vaginal discharge and vaginal pain.   Musculoskeletal:  Positive for myalgias. Negative for arthralgias, back pain, gait problem, joint swelling, neck pain and neck stiffness.   Skin:  Negative for color change, pallor, rash and wound.   Allergic/Immunologic: Positive for food allergies. Negative for environmental allergies and immunocompromised state.   Neurological:  Positive for numbness. Negative for dizziness, tremors, seizures, syncope, facial asymmetry, speech difficulty, weakness, light-headedness and headaches.   Hematological:  Negative for adenopathy. Does not bruise/bleed easily.   Psychiatric/Behavioral:  Positive for dysphoric mood. Negative for agitation, behavioral problems, confusion, decreased concentration, hallucinations, self-injury, sleep disturbance and suicidal ideas. The patient is not nervous/anxious and is not hyperactive.       The remaining review of systems is negative as otherwise stated in the HPI.    Vital Signs: /90 (BP Location: Left arm, Patient Position: Sitting, Cuff Size: Adult)   Temp 98.4 °F (36.9 °C) (Infrared)   Ht 162.6 cm (64\")   Wt 73.5 kg (162 lb)   BMI 27.81 kg/m²        Physical Exam  Vitals and nursing note reviewed.   Constitutional:       Appearance: Normal appearance. She is well-developed.   HENT:      Head: Normocephalic and atraumatic.   Eyes:      Extraocular Movements: Extraocular movements intact.   Neck:      Comments: Tenderness to palpation in the surrounding paraspinous musculature in the low back/hip with worsening on the left.   Pulmonary:      Effort: Pulmonary effort is normal.   Musculoskeletal:      Right upper arm: Normal.      Left upper arm: Normal.      Cervical back: Normal, full passive range of motion without pain and normal range " of motion. No tenderness or bony tenderness. Muscular tenderness present. No pain with movement. Normal range of motion.      Thoracic back: Normal. No tenderness or bony tenderness.      Lumbar back: Tenderness present. No bony tenderness. Positive left straight leg raise test. Negative right straight leg raise test.        Back:       Left hip: Tenderness present.      Right knee: Normal.      Left knee: Normal.      Right lower leg: Normal. No edema.      Left lower leg: Normal. No edema.      Right ankle: Normal.      Left ankle: Normal.      Right foot: Normal.      Left foot: Normal.      Comments: Patient had 5 out of 5 strength with hip flexion, plantar and dorsiflexion bilaterally.  strength 5/5 bilaterally. Upper extremity strength 5/5 throughout.   Red represents tenderness to palpation, specifically lateralized to the left around previous incision site. Black represents incision location approximation. Incision has healed nicely with appropriate scarring.      Skin:     Capillary Refill: Capillary refill takes less than 2 seconds.   Neurological:      Mental Status: She is alert and oriented to person, place, and time.      Cranial Nerves: No cranial nerve deficit or facial asymmetry.      Sensory: Sensation is intact. No sensory deficit.      Motor: Motor function is intact. No weakness, tremor, atrophy, abnormal muscle tone or seizure activity.      Coordination: Coordination is intact. Finger-Nose-Finger Test and Heel to Shin Test normal.      Gait: Gait is intact. Gait and tandem walk normal.      Deep Tendon Reflexes: Reflexes are normal and symmetric.      Reflex Scores:       Tricep reflexes are 2+ on the right side and 2+ on the left side.       Bicep reflexes are 2+ on the right side and 2+ on the left side.       Brachioradialis reflexes are 2+ on the right side and 2+ on the left side.       Patellar reflexes are 2+ on the right side and 2+ on the left side.       Achilles reflexes are 2+  on the right side and 2+ on the left side.     Comments: Straight leg raise was positive on the right. Negative on the left.   Slump test was negative bilaterally.   Intact vibratory and temperature sensation bilaterally.  No signs of ankle clonus bilaterally.  Was able to ambulate on heels and tiptoes free of pain.   Hoffmans negative bilaterally.     During Romberg testing, patient had increased response times to correct balance.    Psychiatric:         Mood and Affect: Mood is anxious.         Behavior: Behavior normal. Behavior is cooperative.         Thought Content: Thought content normal.         Judgment: Judgment normal.      Comments: Patient tearful throughout examination.             Social History    Tobacco Use      Smoking status: Never      Smokeless tobacco: Never       Tobacco Use: Low Risk  (4/5/2024)    Patient History     Smoking Tobacco Use: Never     Smokeless Tobacco Use: Never     Passive Exposure: Not on file           STEADI Fall Risk Assessment has not been completed.      Diagnostic Studies: (All imaging is independently reviewed unless stated otherwise.)      Assessment/Plan    Diagnoses and all orders for this visit:    1. Balance problem (Primary)  -     Ambulatory Referral to Neurology    2. Lumbar disc herniation with radiculopathy  -     MRI Lumbar Spine With & Without Contrast; Future    3. Left leg pain    4. Anxiety    5. Post-operative pain    6. Other chronic postprocedural pain         This is a 43 year old female who is well-known to our office. She underwent left L5-S1 microdiscectomy with Dr. Cee on 9/15/2020 due to left sided S1 radiculopathy. Upon reviewing post-op visit notes, she has been seen in our office on multiple occasions in the acute post-operative period with no issues with her wound healing, improvement of preoperative left lower extremity radicular pain, but continued to harbor numbness/tingling. She was seen on multiple occasions for reoccurring left  "lower extremity cramps/pain. Prior evaluation treatments including doppler (negative for DVT), physical therapy, gabapentin (unable to tolerate), steroids, knee x-ray (negative). She was ultimately seen by our psychologist Re Gregory who ultimately diagnosed her with adjustment disorder, anxiety, depression. She has a PMHx significant for anxiety, depression, PTSD, and chronic pain problems. She self-medicates with marijuana.     She presents back to our office today (3 years post-op) with persistent LBP, LLE pain/tingling, and weakness of the knee. She has multiple somatic complaints that consist of \"locking of her hands,\" generalized chronic pain, shooting pain under her left breast, and a sensation of a \"stepping on a ball\" under her left foot, and balance issues. She is tearful during the examination, as she stresses that she has had a difficult few years dealing with her surgical procedures and partner's struggle to bledsoe breast CA. Based on her physical exam findings, I do believe that she requires further workup with post-operative imaging. I have ordered an MRI with and without contrast to evaluate for any underlying etiology of her persistent post-op LLE symptoms. She has difficulty describing her LLE symptoms and it is unclear whether she is suffering from lumbar radiculopathy. As far as her other symptoms, I feel that a referral to Neurology is appropriate at this time. She can follow-up with me once her MRI is complete, or sooner if clinically indicated. I reviewed the signs/symptoms of lumbar neural compression including those related to cauda equina in which she denies.     Patient encouraged to contact us if she has any changes in her condition or any concerns.    Any copied data from previous notes included in the (1) HPI, (2) PE, (3) MDM and/or Assessment and Plan has been reviewed and accurate as of 4/5/24. I spent a total of 39 minutes review past medical records, office notes, telephone " encounters, imaging, labs, medications, medical, and surgical history during today's visit.     Didi Oseguera PA-C  4/5/24

## 2024-04-21 ENCOUNTER — HOSPITAL ENCOUNTER (OUTPATIENT)
Dept: MRI IMAGING | Facility: HOSPITAL | Age: 44
Discharge: HOME | End: 2024-04-21
Payer: COMMERCIAL

## 2024-04-21 DIAGNOSIS — M51.16 LUMBAR DISC HERNIATION WITH RADICULOPATHY: ICD-10-CM

## 2024-04-21 PROCEDURE — 72158 MRI LUMBAR SPINE W/O & W/DYE: CPT

## 2024-04-21 PROCEDURE — 0 GADOBENATE DIMEGLUMINE 529 MG/ML SOLUTION

## 2024-04-21 PROCEDURE — A9577 INJ MULTIHANCE: HCPCS

## 2024-04-21 RX ADMIN — GADOBENATE DIMEGLUMINE 15 ML: 529 INJECTION, SOLUTION INTRAVENOUS at 17:52

## 2024-04-22 ENCOUNTER — TELEPHONE (OUTPATIENT)
Dept: NEUROSURGERY | Facility: OTHER | Age: 44
End: 2024-04-22
Payer: COMMERCIAL

## 2024-04-22 NOTE — TELEPHONE ENCOUNTER
PATIENT CALLED IN INQUIRING ABOUT A SOONER APPT DUE TO HAVING HER MRI COMPLETED SOONER- SHE IS SCHEDULED WITH KRISTIN LEOS PA-C ON 05/01/24- HER MRI WAS COMPLETED- 04/21/24 @ [facility]-[phone] (home)     PLEASE ADVISE- THANK YOU!

## 2024-05-01 ENCOUNTER — OFFICE VISIT (OUTPATIENT)
Dept: NEUROSURGERY | Facility: CLINIC | Age: 44
End: 2024-05-01
Payer: COMMERCIAL

## 2024-05-01 VITALS — TEMPERATURE: 97.8 F | HEIGHT: 64 IN | WEIGHT: 161 LBS | BODY MASS INDEX: 27.49 KG/M2

## 2024-05-01 DIAGNOSIS — M47.816 FACET ARTHROPATHY, LUMBAR: ICD-10-CM

## 2024-05-01 DIAGNOSIS — M51.36 DDD (DEGENERATIVE DISC DISEASE), LUMBAR: ICD-10-CM

## 2024-05-01 DIAGNOSIS — G89.18 POST-OPERATIVE PAIN: Primary | ICD-10-CM

## 2024-05-01 NOTE — PROGRESS NOTES
Name: Gerri Dow    : 1980     MRN: [id number]     Primary Care Provider: Shannon Moody MD    Chief Complaint  Back Pain and Leg Pain      History of Present Illness:  Gerri Dow is a 43 y.o. female known to the neurosurgery practice whom I saw a few weeks ago for low back pain and left leg pain. She has a history of a left-sided L5-S1 microdiscectomy in  with Dr. Cee and has experienced lingering paresthesias in a non-dermatomal pattern in the left lower extremity. Due to her low extremity paresthesias that have persisted since surgery, I ultimately decided that post-operative imaging was necessary to rule out spinal etiology with an MRI. She had litany of somatic complaints and I ultimately referred her to neurology as well. She presents today to review her Lumbar MRI. She tells me that she has been more active and is participating in swimming exercises at her own will since our last visit with significant relief of many of her symptoms. She states that her leg pain and back pain have improved and denies any new symptoms.     The following portions of the patient's history were reviewed and updated as appropriate and include current medications, allergies, allergies, past family history, past medical history, past social history, past surgical history and problem list.     PMHX  Allergies:  Allergies   Allergen Reactions    Gabapentin Other (See Comments)     hallucinations     Medications    Current Outpatient Medications:     acetaminophen (TYLENOL) 500 MG tablet, Take 2 tablets by mouth Every 6 (Six) Hours As Needed for Mild Pain., Disp: , Rfl:     albuterol sulfate  (90 Base) MCG/ACT inhaler, Inhale 2 puffs Every 6 (Six) Hours As Needed for Wheezing or Shortness of Air., Disp: 6.7 g, Rfl: 0    Auvi-Q 0.3 MG/0.3ML solution auto-injector injection, INJECT AS NEEDED FOR SEVERE ALLERGIC REACTION INCLUDING ANAPHYLAXIS AS DIRECTED. TAKE 1 INJECTOR AS NEEDED FOR ANAPHYLAXIS. KEEP ON  SELF AT, Disp: , Rfl:     diphenhydrAMINE (BENADRYL) 25 mg capsule, Take 1 capsule by mouth Every 6 (Six) Hours As Needed., Disp: , Rfl:     EPINEPHrine (EPIPEN) 0.3 MG/0.3ML solution auto-injector injection, Auvi-Q 0.3 mg/0.3 mL injection, auto-injector  INJECT AS NEEDED FOR SEVERE ALLERGIC REACTION INCLUDING ANAPHYLAXIS AS DIRECTED. TAKE 1 INJECTOR AS NEEDED FOR ANAPHYLAXIS. KEEP ON SELF AT, Disp: , Rfl:   Past Medical History:  Past Medical History:   Diagnosis Date    Anxiety     Asthma     Uterine fibroid      Past Surgical History:  Past Surgical History:   Procedure Laterality Date    CYST REMOVAL      right shoulder    ENDOMETRIAL ABLATION W/ NOVASURE  10/2021    HYSTERECTOMY  05/11/2023    LUMBAR DISCECTOMY Left 09/15/2020    Procedure: LUMBAR DISCECTOMY MICRO LEFT L5-S1;  Surgeon: Billy Cee MD;  Location: [facility];  Service: Neurosurgery;  Laterality: Left;    TONSILLECTOMY       Social Hx:  Social History     Tobacco Use    Smoking status: Never    Smokeless tobacco: Never   Vaping Use    Vaping status: Every Day    Substances: CBD    Devices: Disposable   Substance Use Topics    Alcohol use: Yes     Comment: 2 or less drinks a week    Drug use: Yes     Types: Marijuana     Comment: Every couple days     Family Hx:  Family History   Problem Relation Age of Onset    Heart disease Mother     Hypertension Mother     Diabetes Mother     Hyperlipidemia Mother     Hyperlipidemia Father     Hypertension Father     Depression Father     Other Sister         Nonhodgkins lymphoma    No Known Problems Brother         Smoker    Breast cancer Maternal Aunt 50    Ovarian cancer Neg Hx      Review of Systems:        Review of Systems   Constitutional:  Negative for activity change, appetite change, chills, diaphoresis, fatigue, fever and unexpected weight change.   HENT:  Negative for congestion, dental problem, drooling, ear discharge, ear pain, facial swelling, hearing loss, mouth sores, nosebleeds,  "postnasal drip, rhinorrhea, sinus pressure, sinus pain, sneezing, sore throat, tinnitus, trouble swallowing and voice change.    Eyes:  Negative for photophobia, pain, discharge, redness, itching and visual disturbance.   Respiratory:  Negative for apnea, cough, choking, chest tightness, shortness of breath, wheezing and stridor.    Cardiovascular:  Negative for chest pain, palpitations and leg swelling.   Gastrointestinal:  Negative for abdominal distention, abdominal pain, anal bleeding, blood in stool, constipation, diarrhea, nausea, rectal pain and vomiting.   Endocrine: Negative for cold intolerance, heat intolerance, polydipsia, polyphagia and polyuria.   Genitourinary:  Negative for decreased urine volume, difficulty urinating, dysuria, enuresis, flank pain, frequency, genital sores, hematuria and urgency.   Musculoskeletal:  Negative for arthralgias, back pain, gait problem, joint swelling, myalgias, neck pain and neck stiffness.   Skin:  Negative for color change, pallor, rash and wound.   Allergic/Immunologic: Negative for environmental allergies, food allergies and immunocompromised state.   Neurological:  Positive for numbness. Negative for dizziness, tremors, seizures, syncope, facial asymmetry, speech difficulty, weakness, light-headedness and headaches.   Hematological:  Negative for adenopathy. Does not bruise/bleed easily.   Psychiatric/Behavioral:  Negative for agitation, behavioral problems, confusion, decreased concentration, dysphoric mood, hallucinations, self-injury, sleep disturbance and suicidal ideas. The patient is not nervous/anxious and is not hyperactive.    All other systems reviewed and are negative.     The remaining review of systems is negative as otherwise stated in the HPI.    Vital Signs: Temp 97.8 °F (36.6 °C) (Temporal)   Ht 162.6 cm (64\")   Wt 73 kg (161 lb)   BMI 27.64 kg/m²        Physical Exam  Vitals and nursing note reviewed.   Constitutional:       Appearance: Normal " "appearance.   HENT:      Head: Normocephalic and atraumatic.   Musculoskeletal:      Right lower leg: No edema.      Left lower leg: No edema.   Neurological:      Mental Status: She is alert.      Cranial Nerves: No cranial nerve deficit or facial asymmetry.      Sensory: Sensation is intact. No sensory deficit.      Motor: Motor function is intact. No weakness, tremor, atrophy, abnormal muscle tone or seizure activity.      Coordination: Romberg sign negative.      Gait: Gait is intact. Gait and tandem walk normal.   Psychiatric:         Mood and Affect: Mood normal.         Behavior: Behavior normal.         Thought Content: Thought content normal.         Judgment: Judgment normal.            Social History    Tobacco Use      Smoking status: Never      Smokeless tobacco: Never       Tobacco Use: Low Risk  (5/1/2024)    Patient History     Smoking Tobacco Use: Never     Smokeless Tobacco Use: Never     Passive Exposure: Not on file         STEADI Fall Risk Assessment has not been completed.      Diagnostic Studies: (All imaging is independently reviewed unless stated otherwise.)  Today she has for my review an MRI of the Lumbar spine with and without contrast completed on 4/21/24 that reveals mild degenerative changes most significant at L3-S1 but otherwise unremarkable.       Assessment/Plan    Diagnoses and all orders for this visit:    1. Post-operative pain (Primary)  -     Ambulatory Referral to Physical Therapy    2. DDD (degenerative disc disease), lumbar    3. Facet arthropathy, lumbar         The is a 43 year old female whom I saw about a month ago for lingering post-operative pain in the left lower extremity and weakness in her knee. She has a history of a left-sided L5-S1 microdiscectomy with Dr. Cee on September of 2020. She was followed post-operatively with no apparent surgical complications. She has multiple somatic complaints that consist of \"locking of her hands,\" generalized chronic pain, " "shooting pain under her left breast, and a sensation of a \"stepping on a ball\" under her left foot, and balance issues. Since I saw her last, she has begun aquatic therapy on her own and has noticed an improvement in some of her symptoms. I have put in an order for her to begin physical therapy with aquatics. I reviewed her MRI with her, using the assistance of the 3D spine model. There is no obvious explanation for her post-operative symptoms based on the imaging that I have. From this point moving forward, she can follow-up with me on an as-needed basis.     Patient encouraged to contact us if she has any changes in her condition or any concerns.    Any copied data from previous notes included in the (1) HPI, (2) PE, (3) MDM and/or Assessment and Plan has been reviewed and accurate as of 05/01/24.    Didi Oseguera PA-C  05/01/24  "

## 2024-08-30 ENCOUNTER — TRANSCRIBE ORDERS (OUTPATIENT)
Dept: ADMINISTRATIVE | Facility: HOSPITAL | Age: 44
End: 2024-08-30
Payer: COMMERCIAL

## 2024-08-30 DIAGNOSIS — Z12.31 VISIT FOR SCREENING MAMMOGRAM: Primary | ICD-10-CM

## 2024-10-21 ENCOUNTER — HOSPITAL ENCOUNTER (OUTPATIENT)
Dept: MAMMOGRAPHY | Facility: HOSPITAL | Age: 44
Discharge: HOME | End: 2024-10-21
Admitting: OBSTETRICS & GYNECOLOGY
Payer: COMMERCIAL

## 2024-10-21 DIAGNOSIS — Z12.31 VISIT FOR SCREENING MAMMOGRAM: ICD-10-CM

## 2024-10-21 PROCEDURE — 77067 SCR MAMMO BI INCL CAD: CPT

## 2024-10-21 PROCEDURE — 77063 BREAST TOMOSYNTHESIS BI: CPT

## 2025-02-28 ENCOUNTER — TELEPHONE (OUTPATIENT)
Dept: OBSTETRICS AND GYNECOLOGY | Facility: CLINIC | Age: 45
End: 2025-02-28
Payer: COMMERCIAL

## 2025-02-28 NOTE — TELEPHONE ENCOUNTER
Provider: DR JAIMIE ARREDONDO    Caller: CELINA EASTMAN    Relationship to Patient: SELF    Pharmacy: NA    Phone Number: [phone] (home)       Reason for Call: LEFT BREAST PAIN    When was the patient last seen: 2 YRS    When did it start: ONGOING    Where is it located: LEFT BREAST    Characteristics of symptom/severity: PAIN GETTING WORSE AND FEELS THAT LEFT BREAST IS BIGGER THAN THE RIGHT SIDE- NO DRAINAGE OR DISCOLORATION    Timing- Is it constant or intermittent: CONSTANT    What makes it worse: PRESSURE    What makes it better: NA    What therapies/medications have you tried: SLEEPS ON HER BACK AND TRIES NOT TO PUT PRESSURE ON THAT BREAST. HER NEUROPATHY IS WORSE ON HER LEFT SIDE.

## 2025-02-28 NOTE — TELEPHONE ENCOUNTER
Spoke with pt and she states she has been having breast pain in her L breast for the last month. She states the pain is getting worse. She denies any lumps, discharge, or discoloration but does report she feels this breast has become bigger than the other. I let her know the office closes at 4:30 and she would need to go to the ED if the pain was severe. She states the pain is not severe that she could wait until early next week. Scheduled with KISHOR on 3/3/25. She VU

## 2025-03-03 ENCOUNTER — OFFICE VISIT (OUTPATIENT)
Dept: OBSTETRICS AND GYNECOLOGY | Facility: CLINIC | Age: 45
End: 2025-03-03
Payer: COMMERCIAL

## 2025-03-03 VITALS
DIASTOLIC BLOOD PRESSURE: 86 MMHG | WEIGHT: 162 LBS | SYSTOLIC BLOOD PRESSURE: 140 MMHG | BODY MASS INDEX: 27.66 KG/M2 | HEIGHT: 64 IN

## 2025-03-03 DIAGNOSIS — M25.512 CHRONIC LEFT SHOULDER PAIN: ICD-10-CM

## 2025-03-03 DIAGNOSIS — G89.29 CHRONIC LEFT SHOULDER PAIN: ICD-10-CM

## 2025-03-03 DIAGNOSIS — F32.89 OTHER DEPRESSION: ICD-10-CM

## 2025-03-03 DIAGNOSIS — N60.19 FIBROCYSTIC BREAST DISEASE (FCBD), UNSPECIFIED LATERALITY: ICD-10-CM

## 2025-03-03 DIAGNOSIS — N64.4 MASTALGIA: Primary | ICD-10-CM

## 2025-03-03 RX ORDER — SODIUM PICOSULFATE, MAGNESIUM OXIDE, AND ANHYDROUS CITRIC ACID 12; 3.5; 1 G/175ML; G/175ML; MG/175ML
LIQUID ORAL SEE ADMIN INSTRUCTIONS
COMMUNITY

## 2025-03-03 NOTE — PROGRESS NOTES
"     OBGYN Office Note      Chief Complaint   Patient presents with    Breast Problem        Subjective     HPI  Gerri Dow is a 44 y.o. female who presents with complaints of  tenderness and swelling in the left breast.    She is reporting a breast problem that she has had for years. She has seen LOS for this in the past [id number]. She states she thinks the breast problem stems from failed back surgery, though her surgery was L5/S1 discectomy. She states this is the same breast pain she has felt for years. She states she felt the breast pain has gotten worse through the years. She reports it feels like soreness that starts under her LT. arm and through the entire LT breast and above the breast into the chest. She states she feels like the pain gets worse for a week every month and she feels like its cyclical. She states it's also worse when she wakes up in the morning. She feels like this is \"nerve related\". She denies any lumps, bumps, rash, nipple drainage, or skin discoloration. She does report inflammation in her LT breast and feels like her LT breast is bigger than the other. She had a mammogram 10/21/24 that was WNL.     She reports she is looking for a grief counselor, states she has lost 4 people in her life recently, but they haven't passed away. She went through a break up with her partner of 20 years and with her family. She is asking about resources.       No LMP recorded (lmp unknown). Patient has had a hysterectomy.         Last mammogram:   Last Completed Mammogram            Awaiting Completion       MAMMOGRAM (Every 2 Years) Order placed this encounter      03/13/2025  Order placed for Mammo Diagnostic Digital Tomosynthesis Bilateral With CAD by Georgina Antonio, KISHOR    10/21/2024  Mammo Screening Digital Tomosynthesis Bilateral With CAD    12/14/2023  Mammo Screening Digital Tomosynthesis Bilateral With CAD    11/30/2022  Mammo Screening Digital Tomosynthesis Bilateral With CAD    01/28/2022  " Mammo Diagnostic Digital Tomosynthesis Left With CAD     Only the first 5 history entries have been loaded, but more history exists.                        Tobacco Usage?: No     Past Medical History:   Diagnosis Date    Anxiety     Asthma     Uterine fibroid        Past Surgical History:   Procedure Laterality Date    CYST REMOVAL      right shoulder    ENDOMETRIAL ABLATION W/ NOVASURE  10/2021    HYSTERECTOMY  05/11/2023    LUMBAR DISCECTOMY Left 09/15/2020    Procedure: LUMBAR DISCECTOMY MICRO LEFT L5-S1;  Surgeon: Billy Cee MD;  Location: [facility];  Service: Neurosurgery;  Laterality: Left;    TONSILLECTOMY         Family History   Problem Relation Age of Onset    Heart disease Mother     Hypertension Mother     Diabetes Mother     Hyperlipidemia Mother     Hyperlipidemia Father     Hypertension Father     Depression Father     Other Sister         Nonhodgkins lymphoma    No Known Problems Brother         Smoker    Breast cancer Maternal Aunt 50    Ovarian cancer Neg Hx           Current Outpatient Medications:     acetaminophen (TYLENOL) 500 MG tablet, Take 2 tablets by mouth Every 6 (Six) Hours As Needed for Mild Pain., Disp: , Rfl:     albuterol sulfate  (90 Base) MCG/ACT inhaler, Inhale 2 puffs Every 6 (Six) Hours As Needed for Wheezing or Shortness of Air., Disp: 6.7 g, Rfl: 0    diphenhydrAMINE (BENADRYL) 25 mg capsule, Take 1 capsule by mouth Every 6 (Six) Hours As Needed., Disp: , Rfl:     Sod Picosulfate-Mag Ox-Cit Acd (Clenpiq) 10-3.5-12 MG-GM -GM/175ML solution, See Admin Instructions., Disp: , Rfl:      Review of Systems   Constitutional: Negative.    HENT: Negative.     Eyes: Negative.    Respiratory: Negative.     Cardiovascular: Negative.    Gastrointestinal: Negative.    Endocrine: Negative.    Genitourinary: Negative.  Positive for breast pain.   Musculoskeletal: Negative.    Skin: Negative.    Allergic/Immunologic: Negative.    Neurological: Negative.    Hematological:  "Negative.    Psychiatric/Behavioral: Negative.  Positive for depressed mood.        I have reviewed and agree with the HPI, ROS, and historical information as entered above. Georgina Hintonnard, KISHOR      Objective   /86   Ht 162.6 cm (64\")   Wt 73.5 kg (162 lb)   LMP  (LMP Unknown)   BMI 27.81 kg/m²     Physical Exam  Vitals and nursing note reviewed. Exam conducted with a chaperone present.   Constitutional:       Appearance: Normal appearance.   Pulmonary:      Effort: Pulmonary effort is normal. No retractions.   Chest:      Chest wall: No mass.   Breasts:     Right: Normal. No mass, nipple discharge, skin change or tenderness.      Left: Normal. No mass, nipple discharge, skin change or tenderness.      Comments: Fibrocystic tissue present bilaterally. No discrete masses appreciated.    Musculoskeletal:         General: Normal range of motion.   Neurological:      Mental Status: She is alert and oriented to person, place, and time.           Assessment & Plan     Assessment     Problem List Items Addressed This Visit    None  Visit Diagnoses         Mastalgia    -  Primary    Relevant Orders    Mammo Diagnostic Digital Tomosynthesis Bilateral With CAD    US Breast Bilateral Complete      Fibrocystic breast disease (FCBD), unspecified laterality        Relevant Orders    Mammo Diagnostic Digital Tomosynthesis Bilateral With CAD    US Breast Bilateral Complete      Chronic left shoulder pain        Relevant Orders    Ambulatory Referral to Physical Therapy for Evaluation & Treatment      Other depression                  Plan   Discussed fibrocystic disease and alleviating measures.  Reassured the patient that the finding is most likely benign.  Arranged for mammogram.  Arranged for ultrasound.  [employer] to find a therapist who specializes in grief.   PT order placed as pt feels there may be a disc herniation in her neck causing her left shoulder and breast pain.   Return for Annual physical in next " few months.      KISHOR Bryant  03/03/2025

## 2025-03-20 ENCOUNTER — TELEPHONE (OUTPATIENT)
Dept: OBSTETRICS AND GYNECOLOGY | Facility: CLINIC | Age: 45
End: 2025-03-20
Payer: COMMERCIAL

## 2025-03-20 NOTE — TELEPHONE ENCOUNTER
Called patient she states that she does not want to do diagnostic mammogram she wants to see neurology. Explained to pt she will need to see her PCP for that type of referral that is not usually a referral we place. MELISA

## 2025-06-03 ENCOUNTER — TRANSCRIBE ORDERS (OUTPATIENT)
Dept: ADMINISTRATIVE | Facility: HOSPITAL | Age: 45
End: 2025-06-03
Payer: COMMERCIAL

## 2025-06-03 DIAGNOSIS — Z12.31 VISIT FOR SCREENING MAMMOGRAM: Primary | ICD-10-CM

## 2025-08-04 ENCOUNTER — TELEPHONE (OUTPATIENT)
Dept: OBSTETRICS AND GYNECOLOGY | Facility: CLINIC | Age: 45
End: 2025-08-04
Payer: COMMERCIAL

## 2025-08-04 DIAGNOSIS — R10.2 PELVIC PAIN: Primary | ICD-10-CM

## 2025-08-06 ENCOUNTER — OFFICE VISIT (OUTPATIENT)
Dept: OBSTETRICS AND GYNECOLOGY | Facility: CLINIC | Age: 45
End: 2025-08-06
Payer: COMMERCIAL

## 2025-08-06 VITALS — DIASTOLIC BLOOD PRESSURE: 78 MMHG | SYSTOLIC BLOOD PRESSURE: 122 MMHG | WEIGHT: 167 LBS | BODY MASS INDEX: 28.67 KG/M2

## 2025-08-06 DIAGNOSIS — Z00.00 ENCOUNTER FOR MEDICAL EXAMINATION TO ESTABLISH CARE: ICD-10-CM

## 2025-08-06 DIAGNOSIS — R10.32 LEFT LOWER QUADRANT PAIN: ICD-10-CM

## 2025-08-06 DIAGNOSIS — R10.2 PELVIC PAIN: Primary | ICD-10-CM

## 2025-08-06 LAB
BILIRUB BLD-MCNC: NEGATIVE MG/DL
GLUCOSE UR STRIP-MCNC: NEGATIVE MG/DL
KETONES UR QL: NEGATIVE
LEUKOCYTE EST, POC: NEGATIVE
NITRITE UR-MCNC: NEGATIVE MG/ML
PH UR: 6 [PH] (ref 5–8)
PROT UR STRIP-MCNC: NEGATIVE MG/DL
RBC # UR STRIP: NEGATIVE /UL
SP GR UR: 1.01 (ref 1–1.03)
UROBILINOGEN UR QL: NORMAL

## 2025-08-13 ENCOUNTER — OFFICE VISIT (OUTPATIENT)
Dept: NEUROSURGERY | Facility: CLINIC | Age: 45
End: 2025-08-13
Payer: COMMERCIAL

## 2025-08-13 VITALS — BODY MASS INDEX: 28.73 KG/M2 | HEIGHT: 64 IN | WEIGHT: 168.3 LBS

## 2025-08-13 DIAGNOSIS — M51.16 LUMBAR DISC HERNIATION WITH RADICULOPATHY: Primary | ICD-10-CM

## 2025-08-13 PROCEDURE — 99214 OFFICE O/P EST MOD 30 MIN: CPT | Performed by: PHYSICIAN ASSISTANT

## 2025-08-21 ENCOUNTER — HOSPITAL ENCOUNTER (OUTPATIENT)
Facility: HOSPITAL | Age: 45
Discharge: HOME | End: 2025-08-21
Payer: COMMERCIAL

## 2025-08-21 DIAGNOSIS — M51.16 LUMBAR DISC HERNIATION WITH RADICULOPATHY: ICD-10-CM

## 2025-08-21 PROCEDURE — 72158 MRI LUMBAR SPINE W/O & W/DYE: CPT

## 2025-08-21 PROCEDURE — 72114 X-RAY EXAM L-S SPINE BENDING: CPT

## 2025-08-21 PROCEDURE — 25510000002 GADOBENATE DIMEGLUMINE 529 MG/ML SOLUTION: Performed by: PHYSICIAN ASSISTANT

## 2025-08-21 PROCEDURE — A9577 INJ MULTIHANCE: HCPCS | Performed by: PHYSICIAN ASSISTANT

## 2025-08-21 RX ADMIN — GADOBENATE DIMEGLUMINE 15 ML: 529 INJECTION, SOLUTION INTRAVENOUS at 16:15

## 2025-08-25 ENCOUNTER — OFFICE VISIT (OUTPATIENT)
Dept: NEUROSURGERY | Facility: CLINIC | Age: 45
End: 2025-08-25
Payer: COMMERCIAL

## 2025-08-25 VITALS — HEIGHT: 64 IN | TEMPERATURE: 98 F | BODY MASS INDEX: 28.68 KG/M2 | WEIGHT: 168 LBS | RESPIRATION RATE: 18 BRPM

## 2025-08-25 DIAGNOSIS — G89.28 OTHER CHRONIC POSTPROCEDURAL PAIN: ICD-10-CM

## 2025-08-25 DIAGNOSIS — G89.18 POST-OPERATIVE PAIN: Primary | ICD-10-CM

## 2025-08-25 DIAGNOSIS — M51.362 DEGENERATION OF INTERVERTEBRAL DISC OF LUMBAR REGION WITH DISCOGENIC BACK PAIN AND LOWER EXTREMITY PAIN: ICD-10-CM

## 2025-08-25 DIAGNOSIS — G89.29 CHRONIC BILATERAL LOW BACK PAIN WITH BILATERAL SCIATICA: ICD-10-CM

## 2025-08-25 DIAGNOSIS — M54.41 CHRONIC BILATERAL LOW BACK PAIN WITH BILATERAL SCIATICA: ICD-10-CM

## 2025-08-25 DIAGNOSIS — M54.42 CHRONIC BILATERAL LOW BACK PAIN WITH BILATERAL SCIATICA: ICD-10-CM

## (undated) DEVICE — SYR CONTRL LUERLOK 10CC

## (undated) DEVICE — TB SXN FRAZIER 12F STRL

## (undated) DEVICE — ELECTRD BLD EXT EDGE/INSUL 1P 4IN

## (undated) DEVICE — SNAP KOVER: Brand: UNBRANDED

## (undated) DEVICE — PK NEURO DISC 10

## (undated) DEVICE — CVR HNDL LIGHT RIGID

## (undated) DEVICE — Device

## (undated) DEVICE — SPNG GZ WOVN 4X4IN 12PLY 10/BX STRL

## (undated) DEVICE — GLV SURG BIOGEL LTX PF 8

## (undated) DEVICE — GLV SURG PREMIERPRO MIC LTX PF SZ8.5 BRN

## (undated) DEVICE — DRP MICROSCOP ELIMINATES GLAS COVR

## (undated) DEVICE — INTENDED USE FOR SURGICAL MARKING ON INTACT SKIN, ALSO PROVIDES A PERMANENT METHOD OF IDENTIFYING OBJECTS IN THE OPERATING ROOM: Brand: WRITESITE® REGULAR TIP SKIN MARKER

## (undated) DEVICE — DRSNG WND BORDR/ADHS NONADHR/GZ LF 4X4IN STRL

## (undated) DEVICE — TOOL T12MH25M LEGEND 12CM 2.5MM MH 2FLT: Brand: MIDAS REX ™

## (undated) DEVICE — 3M™ STERI-DRAPE™ INSTRUMENT POUCH 1018: Brand: STERI-DRAPE™

## (undated) DEVICE — INSTRUMENT 9560705 DISP 22MMX5CM RTRCT: Brand: METRX® SYSTEM

## (undated) DEVICE — 3M™ WARMING BLANKET, UPPER BODY, 10 PER CASE, 42268: Brand: BAIR HUGGER™

## (undated) DEVICE — NEEDLE, QUINCKE 22GX3.5": Brand: MEDLINE INDUSTRIES, INC.

## (undated) DEVICE — NDL HYPO ECLPS SFTY 25G 1 1/2IN

## (undated) DEVICE — STRAP POSTN KN/BDY FM 5X72IN DISP

## (undated) DEVICE — TB SXN FRAZIER 8F STRL

## (undated) DEVICE — GOWN,REINF,POLY,ECL,PP SLV,3XL,XLONG: Brand: MEDLINE